# Patient Record
Sex: MALE | Race: WHITE | NOT HISPANIC OR LATINO | Employment: OTHER | URBAN - NONMETROPOLITAN AREA
[De-identification: names, ages, dates, MRNs, and addresses within clinical notes are randomized per-mention and may not be internally consistent; named-entity substitution may affect disease eponyms.]

---

## 2017-05-31 ENCOUNTER — OFFICE VISIT (OUTPATIENT)
Dept: OTOLARYNGOLOGY | Facility: CLINIC | Age: 52
End: 2017-05-31

## 2017-05-31 VITALS — BODY MASS INDEX: 26.06 KG/M2 | TEMPERATURE: 97.9 F | HEIGHT: 67 IN | WEIGHT: 166 LBS

## 2017-05-31 DIAGNOSIS — H92.01 OTALGIA OF RIGHT EAR: Primary | ICD-10-CM

## 2017-05-31 PROCEDURE — 99214 OFFICE O/P EST MOD 30 MIN: CPT | Performed by: OTOLARYNGOLOGY

## 2017-05-31 RX ORDER — VORTIOXETINE 10 MG/1
TABLET, FILM COATED ORAL
Refills: 5 | COMMUNITY
Start: 2017-04-11 | End: 2019-05-16

## 2017-05-31 RX ORDER — PANTOPRAZOLE SODIUM 40 MG/1
40 TABLET, DELAYED RELEASE ORAL DAILY
COMMUNITY
End: 2022-01-05

## 2017-05-31 RX ORDER — MIRTAZAPINE 45 MG/1
45 TABLET, FILM COATED ORAL
COMMUNITY
End: 2019-05-16

## 2017-05-31 RX ORDER — OXYCODONE AND ACETAMINOPHEN 10; 325 MG/1; MG/1
1 TABLET ORAL EVERY 6 HOURS
COMMUNITY

## 2017-05-31 RX ORDER — FLUTICASONE PROPIONATE 50 MCG
2 SPRAY, SUSPENSION (ML) NASAL DAILY
COMMUNITY

## 2019-01-16 ENCOUNTER — TRANSCRIBE ORDERS (OUTPATIENT)
Dept: PODIATRY | Facility: CLINIC | Age: 54
End: 2019-01-16

## 2019-01-16 DIAGNOSIS — M79.671 HEEL PAIN, BILATERAL: Primary | ICD-10-CM

## 2019-01-16 DIAGNOSIS — M79.672 HEEL PAIN, BILATERAL: Primary | ICD-10-CM

## 2019-02-14 ENCOUNTER — OFFICE VISIT (OUTPATIENT)
Dept: PODIATRY | Facility: CLINIC | Age: 54
End: 2019-02-14

## 2019-02-14 VITALS
WEIGHT: 166 LBS | HEIGHT: 67 IN | DIASTOLIC BLOOD PRESSURE: 75 MMHG | BODY MASS INDEX: 26.06 KG/M2 | OXYGEN SATURATION: 98 % | HEART RATE: 75 BPM | SYSTOLIC BLOOD PRESSURE: 129 MMHG

## 2019-02-14 DIAGNOSIS — G89.29 CHRONIC HEEL PAIN, UNSPECIFIED LATERALITY: Primary | ICD-10-CM

## 2019-02-14 DIAGNOSIS — M47.816 LUMBAR SPONDYLOSIS: ICD-10-CM

## 2019-02-14 DIAGNOSIS — R52 PAIN: ICD-10-CM

## 2019-02-14 DIAGNOSIS — M72.2 PLANTAR FASCIITIS: ICD-10-CM

## 2019-02-14 DIAGNOSIS — M79.673 CHRONIC HEEL PAIN, UNSPECIFIED LATERALITY: Primary | ICD-10-CM

## 2019-02-14 PROCEDURE — 99203 OFFICE O/P NEW LOW 30 MIN: CPT | Performed by: PODIATRIST

## 2019-02-14 NOTE — PROGRESS NOTES
Mario Crump  1965  53 y.o. male     Patient presents to clinic today with complaint of bilateral foot pain.    02/14/2019  Chief Complaint   Patient presents with   • Left Foot - Pain   • Right Foot - Pain, Plantar Warts           History of Present Illness    Mario Crump is a 53 y.o. male who presents for evaluation of bilateral foot pain.  He states the pain is primarily located in the bottoms of his heels.  He states is a chronic issue which is been bothering him for greater than 10 years.  He denies any history of trauma or injuries.  He is change boots but otherwise denies any formal treatments for this issue.  He does have a history of chronic lower back issues and has undergone multiple spinal fusion procedures.  He describes the pain as achy and at times throbbing.  He states the pain is worse while standing in place.      Past Medical History:   Diagnosis Date   • Callus    • Ingrown toenail    • Verruca          Past Surgical History:   Procedure Laterality Date   • BACK SURGERY     • NECK SURGERY     • SHOULDER SURGERY Bilateral    • WRIST SURGERY Bilateral          Family History   Problem Relation Age of Onset   • Cancer Mother    • Osteoporosis Father    • Heart disease Father    • Diabetes Father    • Heart disease Brother          Social History     Socioeconomic History   • Marital status: Single     Spouse name: Not on file   • Number of children: Not on file   • Years of education: Not on file   • Highest education level: Not on file   Social Needs   • Financial resource strain: Not on file   • Food insecurity - worry: Not on file   • Food insecurity - inability: Not on file   • Transportation needs - medical: Not on file   • Transportation needs - non-medical: Not on file   Occupational History   • Not on file   Tobacco Use   • Smoking status: Current Every Day Smoker     Packs/day: 1.00     Types: Cigarettes   Substance and Sexual Activity   • Alcohol use: Not on file   • Drug  "use: Not on file   • Sexual activity: Not on file   Other Topics Concern   • Not on file   Social History Narrative   • Not on file         Current Outpatient Medications   Medication Sig Dispense Refill   • mirtazapine (REMERON) 45 MG tablet Take 45 mg by mouth.     • oxyCODONE-acetaminophen (PERCOCET)  MG per tablet Take 1 tablet by mouth Every 6 (Six) Hours.     • pantoprazole (PROTONIX) 40 MG EC tablet Take 40 mg by mouth.     • TRINTELLIX 10 MG tablet TAKE 1 TABLET (10 MG) BY ORAL ROUTE ONCE DAILY AT THE SAME TIME EACH DAY  5   • fluticasone (FLONASE) 50 MCG/ACT nasal spray 2 sprays into each nostril.       No current facility-administered medications for this visit.          OBJECTIVE    /75   Pulse 75   Ht 170.2 cm (67\")   Wt 75.3 kg (166 lb)   SpO2 98%   BMI 26.00 kg/m²       Review of Systems   Constitutional: Positive for fatigue.   HENT: Positive for hearing loss.    Eyes: Positive for visual disturbance.   Respiratory: Positive for cough.    Cardiovascular: Positive for leg swelling.   Gastrointestinal: Positive for abdominal pain.   Endocrine: Negative.    Genitourinary: Negative.    Musculoskeletal: Positive for arthralgias, back pain and joint swelling.        Foot pain, Joint pain   Skin: Negative.    Neurological: Positive for numbness and headaches.   Psychiatric/Behavioral:        Depression         Physical Exam   Constitutional: he appears well-developed and well-nourished.   HEENT: Normocephalic. Atraumatic.  CV: No CP. RRR  Resp: Non-labored respirations.  Psychiatric: he has a normal mood and affect. his behavior is normal.         Lower Extremity Exam:  Vascular: DP/PT pulses palpable 2+.   No edema  Foot warm  Neuro: Protective sensation intact, b/l.  DTRs intact  Negative Tinel over tarsal tunnel  Integument: No open wounds  Intractable plantar keratosis sub-fifth metatarsal head on the righ,t positive tenderness to palpation.  No erythema, scaling  No " masses  Musculoskeletal: LE muscle strength 5/5.   Gait normal  Ankle ROM wnl  STJ ROM full without pain or crepitus  Pain on palpation of plantar calcaneal tubercles, bilateral  Mild tenderness to lateral compression of calcaneus, bilateral              ASSESSMENT AND PLAN    Mario was seen today for pain, pain and plantar warts.    Diagnoses and all orders for this visit:    Chronic heel pain, unspecified laterality    Pain  -     XR Foot 3+ View Right  -     XR Foot 3+ View Left    Plantar fasciitis    Lumbar spondylosis      -Comprehensive foot and ankle exam performed  -Radiographs ordered and reviewed  -Educated pt on diagnosis, etiology and treatment of a typical chronic heel pain.  Discussed likely correlation with chronic low back issues as well as lack of support in his cowboy boots which she is worn every day since he was a kid  -Will refer to physical therapy for custom molded orthotics  -Hold NSAIDs at this time as patient states he was previously taking these for his chronic back issues which did not help with his feet.  -Recheck 4 weeks          This document has been electronically signed by Chacho Allison DPM on February 15, 2019 7:41 AM     EMR Dragon/Transcription disclaimer:   Much of this encounter note is an electronic transcription/translation of spoken language to printed text. The electronic translation of spoken language may permit erroneous, or at times, nonsensical words or phrases to be inadvertently transcribed; Although I have reviewed the note for such errors, some may still exist.    Chacho Allison DPM  2/15/2019  7:41 AM

## 2019-02-19 ENCOUNTER — TRANSCRIBE ORDERS (OUTPATIENT)
Dept: PODIATRY | Facility: CLINIC | Age: 54
End: 2019-02-19

## 2019-02-19 DIAGNOSIS — M72.2 PLANTAR FASCIITIS: Primary | ICD-10-CM

## 2019-02-25 ENCOUNTER — HOSPITAL ENCOUNTER (OUTPATIENT)
Dept: PHYSICAL THERAPY | Facility: HOSPITAL | Age: 54
Setting detail: THERAPIES SERIES
Discharge: HOME | End: 2019-02-25

## 2019-02-25 DIAGNOSIS — M72.2 PLANTAR FASCIITIS: Primary | ICD-10-CM

## 2019-02-25 PROCEDURE — 97161 PT EVAL LOW COMPLEX 20 MIN: CPT | Performed by: PHYSICAL THERAPIST

## 2019-02-25 NOTE — THERAPY EVALUATION
"    Outpatient Physical Therapy Ortho Initial Evaluation  [facility]     Patient Name: Mario Crump  : 1965  MRN: [id number]  Today's Date: 2019      Visit Date: 2019    There is no problem list on file for this patient.       Past Medical History:   Diagnosis Date   • Callus    • Ingrown toenail    • Verruca         Past Surgical History:   Procedure Laterality Date   • BACK SURGERY     • NECK SURGERY     • SHOULDER SURGERY Bilateral    • WRIST SURGERY Bilateral        Visit Dx:     ICD-10-CM ICD-9-CM   1. Plantar fasciitis M72.2 728.71       Patient History     Row Name 19 0910             History    Chief Complaint  Pain;Difficulty Walking  -BB      Type of Pain  Back pain  -BB      Date Current Problem(s) Began  -- chronic   -BB      Brief Description of Current Complaint  Present today with chronic bilateral foot pain. Has history of neck and low back fusions and surgeries. Notes a spot on right foot on left 4th/5th met head that is sensitive. Notes wearing slip on boots all of the time. No history of custom orthotics in the past.   -BB      Patient/Caregiver Goals  Relieve pain  -BB      Patient's Rating of General Health  Good  -BB      Hand Dominance  right-handed  -BB      Occupation/sports/leisure activities  N/A  -BB         Pain     Pain Location  Foot  -BB      Pain at Present  4  -BB      Pain at Best  2  -BB      Pain at Worst  -- \"its crazy\" \"makes ya sick it hurts so bad\"  -BB      Pain Frequency  Constant/continuous  -BB      What Performance Factors Make the Current Problem(s) WORSE?  standing, WB  -BB      Pain Comments  \"Feels like someones beating them with a hammer\"  -BB      Is your sleep disturbed?  Yes  -BB         Fall Risk Assessment    Any falls in the past year:  No  -BB        User Key  (r) = Recorded By, (t) = Taken By, (c) = Cosigned By    Initials Name Provider Type    Kelly Ortiz PT Physical Therapist          PT Ortho     Row Name 19 " 0910       Subjective Comments    Subjective Comments  See patient history   -BB       Precautions and Contraindications    Precautions  spinal surgeries neck and back   -BB       Subjective Pain    Able to rate subjective pain?  yes  -BB    Pre-Treatment Pain Level  4  -BB    Post-Treatment Pain Level  4  -BB       Posture/Observations    Posture/Observations Comments  Supinated foot in gait and in rest. Slight callusing noted on 4th and 5th met head R>L   -BB       Special Tests/Palpation    Special Tests/Palpation  -- no significant ttp noted.   -BB       General ROM    GENERAL ROM COMMENTS  Ankle and toes WFL for normal gait pattern   -BB       MMT (Manual Muscle Testing)    General MMT Comments  WFL for gait   -BB       Sensation    Sensation WNL?  WNL  -BB    Light Touch  No apparent deficits  -BB       Gait/Stairs Assessment/Training    Comment (Gait/Stairs)  Bilateral supinated foot in gait and stance. Bilateral boots wore down in a supinated position.   -BB      User Key  (r) = Recorded By, (t) = Taken By, (c) = Cosigned By    Initials Name Provider Type    Kelly Ortiz, PT Physical Therapist                      Therapy Education  Education Details: Skin inspection and wear schedule   Given: Other (comment)  Program: New  How Provided: Verbal  Provided to: Patient  Level of Understanding: Verbalized     PT OP Goals     Row Name 02/25/19 0910          PT Short Term Goals    STG Date to Achieve  03/18/19  -BB     STG 1  Independent in wear schedule and skin inspection   -BB        Time Calculation    PT Goal Re-Cert Due Date  03/18/19  -BB       User Key  (r) = Recorded By, (t) = Taken By, (c) = Cosigned By    Initials Name Provider Type    Kelly Ortiz, PT Physical Therapist          PT Assessment/Plan     Row Name 02/25/19 0910          PT Assessment    Functional Limitations  Impaired gait;Limitations in functional capacity and performance  -BB     Impairments  Gait;Poor body mechanics  -BB      Assessment Comments  Patient present with bilateral foot pain that is noted with standing and weight bearing activities. Patient has a supinated foot and pes cavus foot. Patient could benefit from custom orthotics to aide in support of the arch. Orthotics to be fabricated per MD orders for tenderfoot firm.   -BB     Rehab Potential  Good  -BB     Patient/caregiver participated in establishment of treatment plan and goals  Yes  -BB     Patient would benefit from skilled therapy intervention  Yes  -BB        PT Plan    Planned CPT's?  PT EVAL LOW COMPLEXITY: 09190;PT ORTHOTIC MGMT/TRAIN EA 15 MIN: 40515;PT THER SUPP EA 15 MIN  -BB     PT Plan Comments  orthotic adjustment PRN and check out   -BB       User Key  (r) = Recorded By, (t) = Taken By, (c) = Cosigned By    Initials Name Provider Type    Kelly Ortiz PT Physical Therapist            Exercises     Row Name 02/25/19 0910             Subjective Comments    Subjective Comments  See patient history   -BB         Subjective Pain    Able to rate subjective pain?  yes  -BB      Pre-Treatment Pain Level  4  -BB      Post-Treatment Pain Level  4  -BB        User Key  (r) = Recorded By, (t) = Taken By, (c) = Cosigned By    Initials Name Provider Type    Kelly Ortiz PT Physical Therapist                                  Time Calculation:         Start Time: 0910  Stop Time: 0944  Time Calculation (min): 34 min     Therapy Charges for Today     Code Description Service Date Service Provider Modifiers Qty    69054430589 HC PT EVAL LOW COMPLEXITY 1 2/25/2019 Kelly Hernandez PT GP 1    15138354410 HC PT-CUSTOM ORTHOTICS-LEVEL 2 2/25/2019 Kelly Hernandez, PT  1                    Kelly Hernandez PT  2/25/2019

## 2019-03-21 ENCOUNTER — DOCUMENTATION (OUTPATIENT)
Dept: PHYSICAL THERAPY | Facility: HOSPITAL | Age: 54
End: 2019-03-21

## 2019-05-16 RX ORDER — TRAMADOL HYDROCHLORIDE 100 MG/1
1 CAPSULE ORAL NIGHTLY
COMMUNITY
End: 2022-01-05

## 2019-05-23 ENCOUNTER — HOSPITAL ENCOUNTER (OUTPATIENT)
Facility: HOSPITAL | Age: 54
Setting detail: HOSPITAL OUTPATIENT SURGERY
Discharge: HOME | End: 2019-05-23
Attending: INTERNAL MEDICINE | Admitting: INTERNAL MEDICINE

## 2019-05-23 ENCOUNTER — ANESTHESIA (OUTPATIENT)
Dept: GASTROENTEROLOGY | Facility: HOSPITAL | Age: 54
End: 2019-05-23

## 2019-05-23 ENCOUNTER — ANESTHESIA EVENT (OUTPATIENT)
Dept: GASTROENTEROLOGY | Facility: HOSPITAL | Age: 54
End: 2019-05-23

## 2019-05-23 VITALS
HEART RATE: 65 BPM | HEIGHT: 67 IN | DIASTOLIC BLOOD PRESSURE: 67 MMHG | TEMPERATURE: 96.8 F | BODY MASS INDEX: 26.06 KG/M2 | RESPIRATION RATE: 18 BRPM | OXYGEN SATURATION: 98 % | WEIGHT: 166 LBS | SYSTOLIC BLOOD PRESSURE: 102 MMHG

## 2019-05-23 DIAGNOSIS — K30 STOMACH UPSET: ICD-10-CM

## 2019-05-23 PROCEDURE — 88305 TISSUE EXAM BY PATHOLOGIST: CPT | Performed by: INTERNAL MEDICINE

## 2019-05-23 PROCEDURE — 87071 CULTURE AEROBIC QUANT OTHER: CPT | Performed by: INTERNAL MEDICINE

## 2019-05-23 PROCEDURE — 25010000002 PROPOFOL 10 MG/ML EMULSION: Performed by: NURSE ANESTHETIST, CERTIFIED REGISTERED

## 2019-05-23 PROCEDURE — 88305 TISSUE EXAM BY PATHOLOGIST: CPT | Performed by: PATHOLOGY

## 2019-05-23 RX ORDER — LIDOCAINE HYDROCHLORIDE 20 MG/ML
INJECTION, SOLUTION INFILTRATION; PERINEURAL AS NEEDED
Status: DISCONTINUED | OUTPATIENT
Start: 2019-05-23 | End: 2019-05-23 | Stop reason: SURG

## 2019-05-23 RX ORDER — PROPOFOL 10 MG/ML
VIAL (ML) INTRAVENOUS AS NEEDED
Status: DISCONTINUED | OUTPATIENT
Start: 2019-05-23 | End: 2019-05-23 | Stop reason: SURG

## 2019-05-23 RX ORDER — DEXTROSE AND SODIUM CHLORIDE 5; .45 G/100ML; G/100ML
20 INJECTION, SOLUTION INTRAVENOUS CONTINUOUS
Status: DISCONTINUED | OUTPATIENT
Start: 2019-05-23 | End: 2019-05-23 | Stop reason: HOSPADM

## 2019-05-23 RX ADMIN — LIDOCAINE HYDROCHLORIDE 100 MG: 20 INJECTION, SOLUTION INFILTRATION; PERINEURAL at 09:13

## 2019-05-23 RX ADMIN — DEXTROSE AND SODIUM CHLORIDE 20 ML/HR: 5; 450 INJECTION, SOLUTION INTRAVENOUS at 08:28

## 2019-05-23 RX ADMIN — PROPOFOL 120 MG: 10 INJECTION, EMULSION INTRAVENOUS at 09:13

## 2019-05-23 NOTE — ANESTHESIA POSTPROCEDURE EVALUATION
Patient: Mario Crump    Procedure Summary     Date:  05/23/19 Room / Location:  [facility] ENDOSCOPY 2 / [facility] ENDOSCOPY    Anesthesia Start:  0913 Anesthesia Stop:  0920    Procedure:  ESOPHAGOGASTRODUODENOSCOPY (N/A ) Diagnosis:       Stomach upset      (Stomach upset [K30])    Surgeon:  Chacho Kennedy DO Provider:  Porter Xiong CRNA    Anesthesia Type:  MAC ASA Status:  2          Anesthesia Type: MAC  Last vitals  BP   109/70 (05/23/19 0823)   Temp   97.9 °F (36.6 °C) (05/23/19 0823)   Pulse   67 (05/23/19 0823)   Resp   18 (05/23/19 0823)     SpO2   99 % (05/23/19 0823)     Post Anesthesia Care and Evaluation    Patient location during evaluation: bedside  Patient participation: waiting for patient participation  Level of consciousness: responsive to verbal stimuli  Pain management: adequate  Airway patency: patent  Anesthetic complications: No anesthetic complications  PONV Status: none  Cardiovascular status: acceptable  Respiratory status: acceptable  Hydration status: acceptable

## 2019-05-23 NOTE — ANESTHESIA PREPROCEDURE EVALUATION
Anesthesia Evaluation     NPO Solid Status: > 8 hours  NPO Liquid Status: > 8 hours           Airway   Mallampati: II  TM distance: >3 FB  Neck ROM: full  no difficulty expected  Dental - normal exam     Pulmonary - normal exam   Cardiovascular - normal exam        Neuro/Psych  (+) psychiatric history,     GI/Hepatic/Renal/Endo    (+)  GERD,      Musculoskeletal     Abdominal    Substance History      OB/GYN          Other                        Anesthesia Plan    ASA 2     MAC     intravenous induction   Anesthetic plan, all risks, benefits, and alternatives have been provided, discussed and informed consent has been obtained with: patient.

## 2019-05-24 LAB
LAB AP CASE REPORT: NORMAL
PATH REPORT.FINAL DX SPEC: NORMAL
PATH REPORT.GROSS SPEC: NORMAL

## 2019-05-25 LAB
BACTERIA SPEC AEROBE CULT: ABNORMAL
BACTERIA SPEC AEROBE CULT: ABNORMAL

## 2019-07-15 ENCOUNTER — HOSPITAL ENCOUNTER (OUTPATIENT)
Dept: CT IMAGING | Facility: HOSPITAL | Age: 54
Discharge: HOME | End: 2019-07-15
Admitting: INTERNAL MEDICINE

## 2019-07-15 DIAGNOSIS — R10.84 ABDOMINAL PAIN, GENERALIZED: ICD-10-CM

## 2019-07-15 PROCEDURE — 0 DIATRIZOATE MEGLUMINE & SODIUM PER 1 ML: Performed by: INTERNAL MEDICINE

## 2019-07-15 PROCEDURE — 25010000002 IOPAMIDOL 61 % SOLUTION: Performed by: INTERNAL MEDICINE

## 2019-07-15 PROCEDURE — 74177 CT ABD & PELVIS W/CONTRAST: CPT

## 2019-07-15 RX ADMIN — IOPAMIDOL 90 ML: 612 INJECTION, SOLUTION INTRAVENOUS at 16:52

## 2019-07-15 RX ADMIN — DIATRIZOATE MEGLUMINE AND DIATRIZOATE SODIUM 45 ML: 660; 100 LIQUID ORAL; RECTAL at 16:52

## 2019-07-18 ENCOUNTER — HOSPITAL ENCOUNTER (OUTPATIENT)
Dept: NUCLEAR MEDICINE | Facility: HOSPITAL | Age: 54
Discharge: HOME | End: 2019-07-18

## 2019-07-18 DIAGNOSIS — R10.84 ABDOMINAL PAIN, GENERALIZED: ICD-10-CM

## 2019-07-18 PROCEDURE — 78264 GASTRIC EMPTYING IMG STUDY: CPT

## 2019-07-18 PROCEDURE — A9541 TC99M SULFUR COLLOID: HCPCS | Performed by: INTERNAL MEDICINE

## 2019-07-18 PROCEDURE — 0 TECHNETIUM SULFUR COLLOID: Performed by: INTERNAL MEDICINE

## 2019-07-18 RX ADMIN — TECHNETIUM TC 99M SULFUR COLLOID 1 DOSE: KIT at 09:15

## 2019-07-19 ENCOUNTER — HOSPITAL ENCOUNTER (OUTPATIENT)
Dept: MRI IMAGING | Facility: HOSPITAL | Age: 54
Discharge: HOME | End: 2019-07-19

## 2019-07-19 ENCOUNTER — HOSPITAL ENCOUNTER (OUTPATIENT)
Dept: MRI IMAGING | Facility: HOSPITAL | Age: 54
Discharge: HOME | End: 2019-07-19
Admitting: INTERNAL MEDICINE

## 2019-07-19 DIAGNOSIS — R93.89 ABNORMAL RADIOLOGICAL FINDINGS IN SKIN AND SUBCUTANEOUS TISSUE: ICD-10-CM

## 2019-07-19 PROCEDURE — 73721 MRI JNT OF LWR EXTRE W/O DYE: CPT

## 2022-01-05 ENCOUNTER — OFFICE VISIT (OUTPATIENT)
Dept: GASTROENTEROLOGY | Facility: CLINIC | Age: 57
End: 2022-01-05

## 2022-01-05 VITALS
DIASTOLIC BLOOD PRESSURE: 78 MMHG | WEIGHT: 157.8 LBS | BODY MASS INDEX: 24.77 KG/M2 | HEART RATE: 77 BPM | HEIGHT: 67 IN | SYSTOLIC BLOOD PRESSURE: 134 MMHG

## 2022-01-05 DIAGNOSIS — R10.10 PAIN OF UPPER ABDOMEN: Primary | ICD-10-CM

## 2022-01-05 DIAGNOSIS — Z12.11 ENCOUNTER FOR SCREENING FOR MALIGNANT NEOPLASM OF COLON: ICD-10-CM

## 2022-01-05 DIAGNOSIS — R14.0 BLOATING: ICD-10-CM

## 2022-01-05 PROCEDURE — 99204 OFFICE O/P NEW MOD 45 MIN: CPT | Performed by: INTERNAL MEDICINE

## 2022-01-05 RX ORDER — TRAZODONE HYDROCHLORIDE 100 MG/1
200 TABLET ORAL DAILY
COMMUNITY
Start: 2021-07-15 | End: 2022-01-20 | Stop reason: SDUPTHER

## 2022-01-05 RX ORDER — DEXTROSE AND SODIUM CHLORIDE 5; .45 G/100ML; G/100ML
30 INJECTION, SOLUTION INTRAVENOUS CONTINUOUS PRN
Status: CANCELLED | OUTPATIENT
Start: 2022-01-13

## 2022-01-05 RX ORDER — OMEPRAZOLE 40 MG/1
40 CAPSULE, DELAYED RELEASE ORAL DAILY
COMMUNITY
Start: 2021-11-01 | End: 2022-05-01

## 2022-01-05 RX ORDER — SUCRALFATE 1 G/1
1 TABLET ORAL 4 TIMES DAILY
Qty: 120 TABLET | Refills: 5 | Status: SHIPPED | OUTPATIENT
Start: 2022-01-05 | End: 2022-02-04

## 2022-01-05 RX ORDER — ALBUTEROL SULFATE 90 UG/1
2 AEROSOL, METERED RESPIRATORY (INHALATION) EVERY 6 HOURS PRN
COMMUNITY
Start: 2021-11-23 | End: 2022-02-22

## 2022-01-05 NOTE — PATIENT INSTRUCTIONS
"BMI for Adults  What is BMI?  Body mass index (BMI) is a number that is calculated from a person's weight and height. BMI can help estimate how much of a person's weight is composed of fat. BMI does not measure body fat directly. Rather, it is an alternative to procedures that directly measure body fat, which can be difficult and expensive.  BMI can help identify people who may be at higher risk for certain medical problems.  What are BMI measurements used for?  BMI is used as a screening tool to identify possible weight problems. It helps determine whether a person is obese, overweight, a healthy weight, or underweight.  BMI is useful for:  · Identifying a weight problem that may be related to a medical condition or may increase the risk for medical problems.  · Promoting changes, such as changes in diet and exercise, to help reach a healthy weight. BMI screening can be repeated to see if these changes are working.  How is BMI calculated?  BMI involves measuring your weight in relation to your height. Both height and weight are measured, and the BMI is calculated from those numbers. This can be done either in English ([place] or metric measurements. Note that charts and online BMI calculators are available to help you find your BMI quickly and easily without having to do these calculations yourself.  To calculate your BMI in English ([place] measurements:    1. Measure your weight in pounds (lb).  2. Multiply the number of pounds by 703.  ? For example, for a person who weighs 180 lb, multiply that number by 703, which equals 126,540.  3. Measure your height in inches. Then multiply that number by itself to get a measurement called \"inches squared.\"  ? For example, for a person who is 70 inches tall, the \"inches squared\" measurement is 70 inches x 70 inches, which equals 4,900 inches squared.  4. Divide the total from step 2 (number of lb x 703) by the total from step 3 (inches squared): 126,540 ÷ 4,900 = 25.8. This is " "your BMI.    To calculate your BMI in metric measurements:  1. Measure your weight in kilograms (kg).  2. Measure your height in meters (m). Then multiply that number by itself to get a measurement called \"meters squared.\"  ? For example, for a person who is 1.75 m tall, the \"meters squared\" measurement is 1.75 m x 1.75 m, which is equal to 3.1 meters squared.  3. Divide the number of kilograms (your weight) by the meters squared number. In this example: 70 ÷ 3.1 = 22.6. This is your BMI.  What do the results mean?  BMI charts are used to identify whether you are underweight, normal weight, overweight, or obese. The following guidelines will be used:  · Underweight: BMI less than 18.5.  · Normal weight: BMI between 18.5 and 24.9.  · Overweight: BMI between 25 and 29.9.  · Obese: BMI of 30 or above.  Keep these notes in mind:  · Weight includes both fat and muscle, so someone with a muscular build, such as an athlete, may have a BMI that is higher than 24.9. In cases like these, BMI is not an accurate measure of body fat.  · To determine if excess body fat is the cause of a BMI of 25 or higher, further assessments may need to be done by a health care provider.  · BMI is usually interpreted in the same way for men and women.  Where to find more information  For more information about BMI, including tools to quickly calculate your BMI, go to these websites:  · Centers for Disease Control and Prevention: www.cdc.gov  · American Heart Association: www.heart.org  · National Heart, Lung, and Blood [place]: www.nhlbi.nih.gov  Summary  · Body mass index (BMI) is a number that is calculated from a person's weight and height.  · BMI may help estimate how much of a person's weight is composed of fat. BMI can help identify those who may be at higher risk for certain medical problems.  · BMI can be measured using English measurements or metric measurements.  · BMI charts are used to identify whether you are underweight, normal " weight, overweight, or obese.  This information is not intended to replace advice given to you by your health care provider. Make sure you discuss any questions you have with your health care provider.  Document Revised: 09/09/2020 Document Reviewed: 07/17/2020  Elsevier Patient Education © 2021 [employer]

## 2022-01-11 ENCOUNTER — LAB (OUTPATIENT)
Dept: LAB | Facility: HOSPITAL | Age: 57
End: 2022-01-11

## 2022-01-11 DIAGNOSIS — Z12.11 ENCOUNTER FOR SCREENING FOR MALIGNANT NEOPLASM OF COLON: ICD-10-CM

## 2022-01-11 DIAGNOSIS — R14.0 BLOATING: ICD-10-CM

## 2022-01-11 DIAGNOSIS — R10.10 PAIN OF UPPER ABDOMEN: ICD-10-CM

## 2022-01-11 LAB — SARS-COV-2 N GENE RESP QL NAA+PROBE: NOT DETECTED

## 2022-01-11 PROCEDURE — C9803 HOPD COVID-19 SPEC COLLECT: HCPCS

## 2022-01-11 PROCEDURE — 87635 SARS-COV-2 COVID-19 AMP PRB: CPT

## 2022-01-13 ENCOUNTER — ANESTHESIA (OUTPATIENT)
Dept: GASTROENTEROLOGY | Facility: HOSPITAL | Age: 57
End: 2022-01-13

## 2022-01-13 ENCOUNTER — HOSPITAL ENCOUNTER (OUTPATIENT)
Facility: HOSPITAL | Age: 57
Setting detail: HOSPITAL OUTPATIENT SURGERY
Discharge: HOME | End: 2022-01-13
Attending: INTERNAL MEDICINE | Admitting: INTERNAL MEDICINE

## 2022-01-13 ENCOUNTER — ANESTHESIA EVENT (OUTPATIENT)
Dept: GASTROENTEROLOGY | Facility: HOSPITAL | Age: 57
End: 2022-01-13

## 2022-01-13 VITALS
BODY MASS INDEX: 24.48 KG/M2 | RESPIRATION RATE: 18 BRPM | DIASTOLIC BLOOD PRESSURE: 68 MMHG | HEIGHT: 67 IN | WEIGHT: 156 LBS | HEART RATE: 79 BPM | TEMPERATURE: 97.1 F | SYSTOLIC BLOOD PRESSURE: 108 MMHG | OXYGEN SATURATION: 99 %

## 2022-01-13 DIAGNOSIS — Z12.11 ENCOUNTER FOR SCREENING FOR MALIGNANT NEOPLASM OF COLON: ICD-10-CM

## 2022-01-13 DIAGNOSIS — R10.10 PAIN OF UPPER ABDOMEN: ICD-10-CM

## 2022-01-13 DIAGNOSIS — R14.0 BLOATING: ICD-10-CM

## 2022-01-13 PROCEDURE — 88305 TISSUE EXAM BY PATHOLOGIST: CPT

## 2022-01-13 PROCEDURE — 43239 EGD BIOPSY SINGLE/MULTIPLE: CPT | Performed by: INTERNAL MEDICINE

## 2022-01-13 PROCEDURE — 45385 COLONOSCOPY W/LESION REMOVAL: CPT | Performed by: INTERNAL MEDICINE

## 2022-01-13 PROCEDURE — 25010000002 PROPOFOL 10 MG/ML EMULSION: Performed by: NURSE ANESTHETIST, CERTIFIED REGISTERED

## 2022-01-13 PROCEDURE — 25010000002 FENTANYL CITRATE (PF) 50 MCG/ML SOLUTION: Performed by: NURSE ANESTHETIST, CERTIFIED REGISTERED

## 2022-01-13 PROCEDURE — 25010000002 MIDAZOLAM PER 1 MG: Performed by: NURSE ANESTHETIST, CERTIFIED REGISTERED

## 2022-01-13 RX ORDER — DEXTROSE AND SODIUM CHLORIDE 5; .45 G/100ML; G/100ML
30 INJECTION, SOLUTION INTRAVENOUS CONTINUOUS PRN
Status: DISCONTINUED | OUTPATIENT
Start: 2022-01-13 | End: 2022-01-13 | Stop reason: HOSPADM

## 2022-01-13 RX ORDER — MIDAZOLAM HYDROCHLORIDE 1 MG/ML
INJECTION INTRAMUSCULAR; INTRAVENOUS AS NEEDED
Status: DISCONTINUED | OUTPATIENT
Start: 2022-01-13 | End: 2022-01-13 | Stop reason: SURG

## 2022-01-13 RX ORDER — PROPOFOL 10 MG/ML
VIAL (ML) INTRAVENOUS AS NEEDED
Status: DISCONTINUED | OUTPATIENT
Start: 2022-01-13 | End: 2022-01-13 | Stop reason: SURG

## 2022-01-13 RX ORDER — FENTANYL CITRATE 50 UG/ML
INJECTION, SOLUTION INTRAMUSCULAR; INTRAVENOUS AS NEEDED
Status: DISCONTINUED | OUTPATIENT
Start: 2022-01-13 | End: 2022-01-13 | Stop reason: SURG

## 2022-01-13 RX ORDER — LIDOCAINE HYDROCHLORIDE 20 MG/ML
INJECTION, SOLUTION INTRAVENOUS AS NEEDED
Status: DISCONTINUED | OUTPATIENT
Start: 2022-01-13 | End: 2022-01-13 | Stop reason: SURG

## 2022-01-13 RX ADMIN — MIDAZOLAM HYDROCHLORIDE 2 MG: 1 INJECTION, SOLUTION INTRAMUSCULAR; INTRAVENOUS at 10:36

## 2022-01-13 RX ADMIN — DEXTROSE AND SODIUM CHLORIDE 30 ML/HR: 5; 450 INJECTION, SOLUTION INTRAVENOUS at 10:11

## 2022-01-13 RX ADMIN — PROPOFOL 50 MG: 10 INJECTION, EMULSION INTRAVENOUS at 10:51

## 2022-01-13 RX ADMIN — LIDOCAINE HYDROCHLORIDE 50 MG: 20 INJECTION, SOLUTION INTRAVENOUS at 10:36

## 2022-01-13 RX ADMIN — PROPOFOL 50 MG: 10 INJECTION, EMULSION INTRAVENOUS at 10:43

## 2022-01-13 RX ADMIN — FENTANYL CITRATE 100 MCG: 50 INJECTION INTRAMUSCULAR; INTRAVENOUS at 10:36

## 2022-01-13 NOTE — ANESTHESIA POSTPROCEDURE EVALUATION
Patient: Mario Crump    Procedure Summary     Date: 01/13/22 Room / Location: [facility] ENDOSCOPY 3 / [facility] ENDOSCOPY    Anesthesia Start: 1031 Anesthesia Stop: 1057    Procedures:       ESOPHAGOGASTRODUODENOSCOPY (N/A )      COLONOSCOPY (N/A ) Diagnosis:       Pain of upper abdomen      Bloating      Encounter for screening for malignant neoplasm of colon      (Pain of upper abdomen [R10.10])      (Bloating [R14.0])      (Encounter for screening for malignant neoplasm of colon [Z12.11])    Surgeons: Maryanne Maxwell MD Provider: Annie Wan CRNA    Anesthesia Type: MAC ASA Status: 2          Anesthesia Type: MAC    Vitals  No vitals data found for the desired time range.          Post Anesthesia Care and Evaluation    Patient location during evaluation: PACU  Patient participation: complete - patient participated  Level of consciousness: awake and alert  Pain score: 0  Pain management: adequate  Airway patency: patent  Anesthetic complications: No anesthetic complications  PONV Status: none  Cardiovascular status: acceptable  Respiratory status: acceptable  Hydration status: acceptable

## 2022-01-13 NOTE — PROGRESS NOTES
[facility]      Chief Complaint:   Chief Complaint   Patient presents with   • Abdominal Pain     cramping        Subjective     HPI:   Mr. Crump is a 56-year-old  male with past medical history of anxiety, COPD, ingestional, GERD Uecker, callus, GERD presenting for evaluation for abdominal pain.  He has intermittent bouts of epigastric cramping abdominal discomfort for past few months associated with bloating.  Pain is intermittent, moderate, nonradiating and is unchanged with food intake or defecation.  He denied nausea, vomiting, diarrhea, constipation, rectal bleeding or weight loss.  Taking Prilosec daily without much help.  Denied NSAID usage.  Has GERD for past several years with symptoms well controlled with PPI.  He is due for colorectal cancer screening.    Past Medical History:   Past Medical History:   Diagnosis Date   • Anxiety    • Callus    • COPD (chronic obstructive pulmonary disease) (HCC)    • GERD (gastroesophageal reflux disease)    • Ingrown toenail    • Verruca        Past Surgical History:  Past Surgical History:   Procedure Laterality Date   • BACK SURGERY     • ENDOSCOPY N/A 5/23/2019    Procedure: ESOPHAGOGASTRODUODENOSCOPY;  Surgeon: Chacho Kennedy DO;  Location: [facility] ENDOSCOPY;  Service: Gastroenterology   • NECK SURGERY      x 3   • SHOULDER SURGERY Bilateral    • WRIST SURGERY Bilateral        Family History:  Family History   Problem Relation Age of Onset   • Cancer Mother    • Osteoporosis Father    • Heart disease Father    • Diabetes Father    • Heart disease Brother        Social History:   reports that he has been smoking cigarettes. He has a 30.00 pack-year smoking history. He has never used smokeless tobacco. He reports that he does not drink alcohol and does not use drugs.    Medications:   Prior to Admission medications    Medication Sig Start Date End Date Taking? Authorizing Provider   albuterol sulfate  (90 Base) MCG/ACT  inhaler Inhale 2 puffs Every 6 (Six) Hours As Needed. 11/23/21 2/22/22 Yes Cheyenne Andrade MD   fluticasone (FLONASE) 50 MCG/ACT nasal spray 2 sprays into the nostril(s) as directed by provider Daily.   Yes Cheyenne Andrade MD   omeprazole (priLOSEC) 40 MG capsule Take 40 mg by mouth Daily. 11/1/21 5/1/22 Yes Cheyenne Andrade MD   oxyCODONE-acetaminophen (PERCOCET)  MG per tablet Take 1 tablet by mouth Every 6 (Six) Hours.   Yes Cheyenne Andrade MD   traZODone (DESYREL) 100 MG tablet Take 200 mg by mouth Daily. 7/15/21 1/12/22 Yes Cheyenne Andrade MD   polyethylene glycol (GoLYTELY) 236 g solution Please use the instructions given in office 1/5/22   Maryanne Maxwell MD   sucralfate (Carafate) 1 g tablet Take 1 tablet by mouth 4 (Four) Times a Day for 30 days. 1/5/22 2/4/22  Maryanne Maxwell MD       Allergies:  Latex    ROS:    Review of Systems   Constitutional: Negative for chills, fatigue, fever and unexpected weight change.   HENT: Negative for congestion, ear discharge, hearing loss, nosebleeds and sore throat.    Eyes: Negative for pain, discharge and redness.   Respiratory: Negative for cough, chest tightness, shortness of breath and wheezing.    Cardiovascular: Negative for chest pain and palpitations.   Gastrointestinal: Positive for abdominal pain. Negative for abdominal distention, blood in stool, constipation, diarrhea, nausea and vomiting.   Endocrine: Negative for cold intolerance, polydipsia, polyphagia and polyuria.   Genitourinary: Negative for dysuria, flank pain, frequency, hematuria and urgency.   Musculoskeletal: Negative for arthralgias, back pain, joint swelling and myalgias.   Skin: Negative for color change, pallor and rash.   Neurological: Negative for tremors, seizures, syncope, weakness and headaches.   Hematological: Negative for adenopathy. Does not bruise/bleed easily.   Psychiatric/Behavioral: Negative for behavioral problems, confusion, dysphoric  "mood, hallucinations and suicidal ideas. The patient is not nervous/anxious.      Objective     Blood pressure 134/78, pulse 77, height 170.2 cm (67\"), weight 71.6 kg (157 lb 12.8 oz).    Physical Exam  Constitutional:       Appearance: He is well-developed.   HENT:      Head: Normocephalic and atraumatic.   Eyes:      Conjunctiva/sclera: Conjunctivae normal.      Pupils: Pupils are equal, round, and reactive to light.   Neck:      Thyroid: No thyromegaly.   Cardiovascular:      Rate and Rhythm: Normal rate and regular rhythm.      Heart sounds: Normal heart sounds. No murmur heard.      Pulmonary:      Effort: Pulmonary effort is normal.      Breath sounds: Normal breath sounds. No wheezing.   Abdominal:      General: Bowel sounds are normal. There is no distension.      Palpations: Abdomen is soft. There is no mass.      Tenderness: There is no abdominal tenderness.      Hernia: No hernia is present.   Genitourinary:     Comments: No lesions noted  Musculoskeletal:         General: No tenderness. Normal range of motion.      Cervical back: Normal range of motion and neck supple.   Lymphadenopathy:      Cervical: No cervical adenopathy.   Skin:     General: Skin is warm and dry.      Findings: No rash.   Neurological:      Mental Status: He is alert and oriented to person, place, and time.      Cranial Nerves: No cranial nerve deficit.   Psychiatric:         Thought Content: Thought content normal.        Extremities: No edema, cyanosis or clubbing.    Assessment/Plan    1.  Epigastric pain with bloating rule out peptic ulcer disease, gastritis and pancreaticobiliary pathology.  Continue PPI and add Carafate 1 g p.o. 4 times daily.  Proceed with EGD for further evaluation.  Biliary evaluation if EGD is unremarkable.  2.  Colorectal cancer screening, schedule colonoscopy.  3.  GERD, well controlled with PPI.  Continue PPI and antireflux lifestyle.  4.  Tobacco usage, recommend cessation.  Diagnoses and all orders " for this visit:    1. Pain of upper abdomen (Primary)  -     Case Request; Standing  -     COVID PRE-OP / PRE-PROCEDURE SCREENING ORDER (NO ISOLATION) - Swab, Nasopharynx; Future  -     Case Request    2. Bloating  -     Case Request; Standing  -     COVID PRE-OP / PRE-PROCEDURE SCREENING ORDER (NO ISOLATION) - Swab, Nasopharynx; Future  -     Case Request    3. Encounter for screening for malignant neoplasm of colon  -     Case Request; Standing  -     COVID PRE-OP / PRE-PROCEDURE SCREENING ORDER (NO ISOLATION) - Swab, Nasopharynx; Future  -     Case Request    Other orders  -     Follow Anesthesia Guidelines / Standing Orders; Future  -     Obtain Informed Consent; Future  -     sucralfate (Carafate) 1 g tablet; Take 1 tablet by mouth 4 (Four) Times a Day for 30 days.  Dispense: 120 tablet; Refill: 5        ESOPHAGOGASTRODUODENOSCOPY (N/A), COLONOSCOPY (N/A)     Diagnosis Plan   1. Pain of upper abdomen  Case Request    COVID PRE-OP / PRE-PROCEDURE SCREENING ORDER (NO ISOLATION) - Swab, Nasopharynx    Case Request   2. Bloating  Case Request    COVID PRE-OP / PRE-PROCEDURE SCREENING ORDER (NO ISOLATION) - Swab, Nasopharynx    Case Request   3. Encounter for screening for malignant neoplasm of colon  Case Request    COVID PRE-OP / PRE-PROCEDURE SCREENING ORDER (NO ISOLATION) - Swab, Nasopharynx    Case Request       Anticipated Surgical Procedure:  Orders Placed This Encounter   Procedures   • COVID PRE-OP / PRE-PROCEDURE SCREENING ORDER (NO ISOLATION) - Swab, Nasopharynx     Standing Status:   Future     Number of Occurrences:   1     Standing Expiration Date:   1/5/2023     Order Specific Question:   Release to patient     Answer:   Immediate     Order Specific Question:   Please select your location:     Answer:   [facility]     Order Specific Question:   COVID Screening Order:     Answer:   In-House: PRE-OP: BD MAX, 8-10 HR TAT [[id number]]     Order Specific Question:   Employed in healthcare setting?      Answer:   No     Order Specific Question:   Symptomatic for COVID-19 as defined by CDC?     Answer:   No     Order Specific Question:   Hospitalized for COVID-19?     Answer:   No     Order Specific Question:   Admitted to ICU for COVID-19?     Answer:   No     Order Specific Question:   Resident in a congregate (group) care setting?     Answer:   No   • Follow Anesthesia Guidelines / Standing Orders     Standing Status:   Future   • Obtain Informed Consent     Standing Status:   Future     Order Specific Question:   Informed Consent Given For     Answer:   egd and colonoscopy       The risks, benefits, and alternatives of this procedure have been discussed with the patient or the responsible party- the patient understands and agrees to proceed.            This document has been electronically signed by Maryanne Maxwell MD on January 13, 2022 10:04 CST

## 2022-01-13 NOTE — H&P
Chief Complaint:        Chief Complaint   Patient presents with   • Abdominal Pain       cramping             Subjective         HPI:   Mr. Crump is a 56-year-old  male with past medical history of anxiety, COPD, ingestional, GERD Uecker, callus, GERD presenting for evaluation for abdominal pain.  He has intermittent bouts of epigastric cramping abdominal discomfort for past few months associated with bloating.  Pain is intermittent, moderate, nonradiating and is unchanged with food intake or defecation.  He denied nausea, vomiting, diarrhea, constipation, rectal bleeding or weight loss.  Taking Prilosec daily without much help.  Denied NSAID usage.  Has GERD for past several years with symptoms well controlled with PPI.  He is due for colorectal cancer screening.     Past Medical History:   Medical History        Past Medical History:   Diagnosis Date   • Anxiety     • Callus     • COPD (chronic obstructive pulmonary disease) (HCC)     • GERD (gastroesophageal reflux disease)     • Ingrown toenail     • Verruca              Surgical History         Past Surgical History:  Past Surgical History:   Procedure Laterality Date   • BACK SURGERY       • ENDOSCOPY N/A 5/23/2019     Procedure: ESOPHAGOGASTRODUODENOSCOPY;  Surgeon: Chacho Kennedy DO;  Location: [facility] ENDOSCOPY;  Service: Gastroenterology   • NECK SURGERY         x 3   • SHOULDER SURGERY Bilateral     • WRIST SURGERY Bilateral              Family History:        Family History   Problem Relation Age of Onset   • Cancer Mother     • Osteoporosis Father     • Heart disease Father     • Diabetes Father     • Heart disease Brother           Social History:   reports that he has been smoking cigarettes. He has a 30.00 pack-year smoking history. He has never used smokeless tobacco. He reports that he does not drink alcohol and does not use drugs.     Medications:           Prior to Admission medications    Medication Sig Start Date End Date Taking?  Authorizing Provider   albuterol sulfate  (90 Base) MCG/ACT inhaler Inhale 2 puffs Every 6 (Six) Hours As Needed. 11/23/21 2/22/22 Yes Cheyenne Andrade MD   fluticasone (FLONASE) 50 MCG/ACT nasal spray 2 sprays into the nostril(s) as directed by provider Daily.     Yes Cheyenne Andrade MD   omeprazole (priLOSEC) 40 MG capsule Take 40 mg by mouth Daily. 11/1/21 5/1/22 Yes Cheyenne Andrade MD   oxyCODONE-acetaminophen (PERCOCET)  MG per tablet Take 1 tablet by mouth Every 6 (Six) Hours.     Yes Cheyenne Andrade MD   traZODone (DESYREL) 100 MG tablet Take 200 mg by mouth Daily. 7/15/21 1/12/22 Yes Cheyenne Andrade MD   polyethylene glycol (GoLYTELY) 236 g solution Please use the instructions given in office 1/5/22     Maryanne Maxwell MD   sucralfate (Carafate) 1 g tablet Take 1 tablet by mouth 4 (Four) Times a Day for 30 days. 1/5/22 2/4/22   Maryanne Maxwell MD         Allergies:  Latex     ROS:    Review of Systems   Constitutional: Negative for chills, fatigue, fever and unexpected weight change.   HENT: Negative for congestion, ear discharge, hearing loss, nosebleeds and sore throat.    Eyes: Negative for pain, discharge and redness.   Respiratory: Negative for cough, chest tightness, shortness of breath and wheezing.    Cardiovascular: Negative for chest pain and palpitations.   Gastrointestinal: Positive for abdominal pain. Negative for abdominal distention, blood in stool, constipation, diarrhea, nausea and vomiting.   Endocrine: Negative for cold intolerance, polydipsia, polyphagia and polyuria.   Genitourinary: Negative for dysuria, flank pain, frequency, hematuria and urgency.   Musculoskeletal: Negative for arthralgias, back pain, joint swelling and myalgias.   Skin: Negative for color change, pallor and rash.   Neurological: Negative for tremors, seizures, syncope, weakness and headaches.   Hematological: Negative for adenopathy. Does not bruise/bleed easily.  "  Psychiatric/Behavioral: Negative for behavioral problems, confusion, dysphoric mood, hallucinations and suicidal ideas. The patient is not nervous/anxious.             Objective         Blood pressure 134/78, pulse 77, height 170.2 cm (67\"), weight 71.6 kg (157 lb 12.8 oz).     Physical Exam  Constitutional:       Appearance: He is well-developed.   HENT:      Head: Normocephalic and atraumatic.   Eyes:      Conjunctiva/sclera: Conjunctivae normal.      Pupils: Pupils are equal, round, and reactive to light.   Neck:      Thyroid: No thyromegaly.   Cardiovascular:      Rate and Rhythm: Normal rate and regular rhythm.      Heart sounds: Normal heart sounds. No murmur heard.       Pulmonary:      Effort: Pulmonary effort is normal.      Breath sounds: Normal breath sounds. No wheezing.   Abdominal:      General: Bowel sounds are normal. There is no distension.      Palpations: Abdomen is soft. There is no mass.      Tenderness: There is no abdominal tenderness.      Hernia: No hernia is present.   Genitourinary:     Comments: No lesions noted  Musculoskeletal:         General: No tenderness. Normal range of motion.      Cervical back: Normal range of motion and neck supple.   Lymphadenopathy:      Cervical: No cervical adenopathy.   Skin:     General: Skin is warm and dry.      Findings: No rash.   Neurological:      Mental Status: He is alert and oriented to person, place, and time.      Cranial Nerves: No cranial nerve deficit.   Psychiatric:         Thought Content: Thought content normal.         Extremities: No edema, cyanosis or clubbing.           Assessment/Plan       1.  Epigastric pain with bloating rule out peptic ulcer disease, gastritis and pancreaticobiliary pathology.  Continue PPI and add Carafate 1 g p.o. 4 times daily.  Proceed with EGD for further evaluation.  Biliary evaluation if EGD is unremarkable.  2.  Colorectal cancer screening, schedule colonoscopy.  3.  GERD, well controlled with PPI.  " Continue PPI and antireflux lifestyle.  4.  Tobacco usage, recommend cessation.  Diagnoses and all orders for this visit:     1. Pain of upper abdomen (Primary)  -     Case Request; Standing  -     COVID PRE-OP / PRE-PROCEDURE SCREENING ORDER (NO ISOLATION) - Swab, Nasopharynx; Future  -     Case Request     2. Bloating  -     Case Request; Standing  -     COVID PRE-OP / PRE-PROCEDURE SCREENING ORDER (NO ISOLATION) - Swab, Nasopharynx; Future  -     Case Request     3. Encounter for screening for malignant neoplasm of colon  -     Case Request; Standing  -     COVID PRE-OP / PRE-PROCEDURE SCREENING ORDER (NO ISOLATION) - Swab, Nasopharynx; Future  -     Case Request     Other orders  -     Follow Anesthesia Guidelines / Standing Orders; Future  -     Obtain Informed Consent; Future  -     sucralfate (Carafate) 1 g tablet; Take 1 tablet by mouth 4 (Four) Times a Day for 30 days.  Dispense: 120 tablet; Refill: 5           ESOPHAGOGASTRODUODENOSCOPY (N/A), COLONOSCOPY (N/A)       Diagnosis Plan   1. Pain of upper abdomen  Case Request     COVID PRE-OP / PRE-PROCEDURE SCREENING ORDER (NO ISOLATION) - Swab, Nasopharynx     Case Request   2. Bloating  Case Request     COVID PRE-OP / PRE-PROCEDURE SCREENING ORDER (NO ISOLATION) - Swab, Nasopharynx     Case Request   3. Encounter for screening for malignant neoplasm of colon  Case Request     COVID PRE-OP / PRE-PROCEDURE SCREENING ORDER (NO ISOLATION) - Swab, Nasopharynx     Case Request         Anticipated Surgical Procedure:        Orders Placed This Encounter   Procedures   • COVID PRE-OP / PRE-PROCEDURE SCREENING ORDER (NO ISOLATION) - Swab, Nasopharynx       Standing Status:   Future       Number of Occurrences:   1       Standing Expiration Date:   1/5/2023       Order Specific Question:   Release to patient       Answer:   Immediate       Order Specific Question:   Please select your location:       Answer:   [facility]       Order Specific Question:   COVID  Screening Order:       Answer:   In-House: PRE-OP: BD MAX, 8-10 HR TAT [[id number]]       Order Specific Question:   Employed in healthcare setting?       Answer:   No       Order Specific Question:   Symptomatic for COVID-19 as defined by CDC?       Answer:   No       Order Specific Question:   Hospitalized for COVID-19?       Answer:   No       Order Specific Question:   Admitted to ICU for COVID-19?       Answer:   No       Order Specific Question:   Resident in a congregate (group) care setting?       Answer:   No   • Follow Anesthesia Guidelines / Standing Orders       Standing Status:   Future   • Obtain Informed Consent       Standing Status:   Future       Order Specific Question:   Informed Consent Given For       Answer:   egd and colonoscopy         The risks, benefits, and alternatives of this procedure have been discussed with the patient or the responsible party- the patient understands and agrees to proceed.

## 2022-01-14 LAB
LAB AP CASE REPORT: NORMAL
PATH REPORT.FINAL DX SPEC: NORMAL

## 2022-01-20 ENCOUNTER — OFFICE VISIT (OUTPATIENT)
Dept: GASTROENTEROLOGY | Facility: CLINIC | Age: 57
End: 2022-01-20

## 2022-01-20 VITALS
WEIGHT: 159.2 LBS | HEART RATE: 71 BPM | DIASTOLIC BLOOD PRESSURE: 75 MMHG | BODY MASS INDEX: 24.99 KG/M2 | SYSTOLIC BLOOD PRESSURE: 151 MMHG | HEIGHT: 67 IN

## 2022-01-20 DIAGNOSIS — R10.10 PAIN OF UPPER ABDOMEN: Primary | ICD-10-CM

## 2022-01-20 PROCEDURE — 99214 OFFICE O/P EST MOD 30 MIN: CPT | Performed by: INTERNAL MEDICINE

## 2022-01-20 RX ORDER — TRAZODONE HYDROCHLORIDE 100 MG/1
2 TABLET ORAL
COMMUNITY
Start: 2022-01-13 | End: 2022-04-14

## 2022-01-20 RX ORDER — DICYCLOMINE HCL 20 MG
20 TABLET ORAL EVERY 6 HOURS
Qty: 120 TABLET | Refills: 5 | Status: SHIPPED | OUTPATIENT
Start: 2022-01-20 | End: 2022-02-19

## 2022-01-28 ENCOUNTER — HOSPITAL ENCOUNTER (OUTPATIENT)
Dept: CT IMAGING | Facility: HOSPITAL | Age: 57
Discharge: HOME | End: 2022-01-28
Admitting: INTERNAL MEDICINE

## 2022-01-28 DIAGNOSIS — R10.10 PAIN OF UPPER ABDOMEN: ICD-10-CM

## 2022-01-28 PROCEDURE — 74177 CT ABD & PELVIS W/CONTRAST: CPT

## 2022-01-28 PROCEDURE — 25010000002 IOPAMIDOL 61 % SOLUTION: Performed by: INTERNAL MEDICINE

## 2022-01-28 RX ADMIN — IOPAMIDOL 90 ML: 612 INJECTION, SOLUTION INTRAVENOUS at 13:52

## 2022-02-01 ENCOUNTER — OFFICE VISIT (OUTPATIENT)
Dept: GASTROENTEROLOGY | Facility: CLINIC | Age: 57
End: 2022-02-01

## 2022-02-01 VITALS
SYSTOLIC BLOOD PRESSURE: 125 MMHG | WEIGHT: 158.8 LBS | HEART RATE: 66 BPM | HEIGHT: 67 IN | BODY MASS INDEX: 24.92 KG/M2 | DIASTOLIC BLOOD PRESSURE: 62 MMHG

## 2022-02-01 DIAGNOSIS — R10.10 PAIN OF UPPER ABDOMEN: Primary | ICD-10-CM

## 2022-02-01 PROCEDURE — 99214 OFFICE O/P EST MOD 30 MIN: CPT | Performed by: INTERNAL MEDICINE

## 2022-02-01 NOTE — PATIENT INSTRUCTIONS
Managing the Challenge of Quitting Smoking  Quitting smoking is a physical and mental challenge. You will face cravings, withdrawal symptoms, and temptation. Before quitting, work with your health care provider to make a plan that can help you manage quitting. Preparation can help you quit and keep you from giving in.  How to manage lifestyle changes  Managing stress  Stress can make you want to smoke, and wanting to smoke may cause stress. It is important to find ways to manage your stress. You might try some of the following:  · Practice relaxation techniques.  ? Breathe slowly and deeply, in through your nose and out through your mouth.  ? Listen to music.  ? Soak in a bath or take a shower.  ? Imagine a peaceful place or vacation.  · Get some support.  ? Talk with family or friends about your stress.  ? Join a support group.  ? Talk with a counselor or therapist.  · Get some physical activity.  ? Go for a walk, run, or bike ride.  ? Play a favorite sport.  ? Practice yoga.    Medicines  Talk with your health care provider about medicines that might help you deal with cravings and make quitting easier for you.  Relationships  Social situations can be difficult when you are quitting smoking. To manage this, you can:  · Avoid parties and other social situations where people might be smoking.  · Avoid alcohol.  · Leave right away if you have the urge to smoke.  · Explain to your family and friends that you are quitting smoking. Ask for support and let them know you might be a bit grumpy.  · Plan activities where smoking is not an option.  General instructions  Be aware that many people gain weight after they quit smoking. However, not everyone does. To keep from gaining weight, have a plan in place before you quit and stick to the plan after you quit. Your plan should include:  · Having healthy snacks. When you have a craving, it may help to:  ? Eat popcorn, carrots, celery, or other cut vegetables.  ? Chew  sugar-free gum.  · Changing how you eat.  ? Eat small portion sizes at meals.  ? Eat 4-6 small meals throughout the day instead of 1-2 large meals a day.  ? Be mindful when you eat. Do not watch television or do other things that might distract you as you eat.  · Exercising regularly.  ? Make time to exercise each day. If you do not have time for a long workout, do short bouts of exercise for 5-10 minutes several times a day.  ? Do some form of strengthening exercise, such as weight lifting.  ? Do some exercise that gets your heart beating and causes you to breathe deeply, such as walking fast, running, swimming, or biking. This is very important.  · Drinking plenty of water or other low-calorie or no-calorie drinks. Drink 6-8 glasses of water daily.    How to recognize withdrawal symptoms  Your body and mind may experience discomfort as you try to get used to not having nicotine in your system. These effects are called withdrawal symptoms. They may include:  · Feeling hungrier than normal.  · Having trouble concentrating.  · Feeling irritable or restless.  · Having trouble sleeping.  · Feeling depressed.  · Craving a cigarette.  To manage withdrawal symptoms:  · Avoid places, people, and activities that trigger your cravings.  · Remember why you want to quit.  · Get plenty of sleep.  · Avoid coffee and other caffeinated drinks. These may worsen some of your symptoms.  These symptoms may surprise you. But be assured that they are normal to have when quitting smoking.  How to manage cravings  Come up with a plan for how to deal with your cravings. The plan should include the following:  · A definition of the specific situation you want to deal with.  · An alternative action you will take.  · A clear idea for how this action will help.  · The name of someone who might help you with this.  Cravings usually last for 5-10 minutes. Consider taking the following actions to help you with your plan to deal with  cravings:  · Keep your mouth busy.  ? Chew sugar-free gum.  ? Suck on hard candies or a straw.  ? Brush your teeth.  · Keep your hands and body busy.  ? Change to a different activity right away.  ? Squeeze or play with a ball.  ? Do an activity or a hobby, such as making bead jewelry, practicing needlepoint, or working with wood.  ? Mix up your normal routine.  ? Take a short exercise break. Go for a quick walk or run up and down stairs.  · Focus on doing something kind or helpful for someone else.  · Call a friend or family member to talk during a craving.  · Join a support group.  · Contact a quitline.  Where to find support  To get help or find a support group:  · Call the National [employer] [place]'s Smoking Quitline: 2-179-QUIT NOW (772-1993)  · Visit the website of the Substance Abuse and Mental Health Services Administration: www.samhsa.gov  · Text QUIT to SmokefreeTXT: 036983  Where to find more information  Visit these websites to find more information on quitting smoking:  · [employer] [place]: www.smokefree.gov  · American Lung Association: www.lung.org  · American Cancer Society: www.cancer.org  · Centers for Disease Control and Prevention: www.cdc.gov  · American Heart Association: www.heart.org  Contact a health care provider if:  · You want to change your plan for quitting.  · The medicines you are taking are not helping.  · Your eating feels out of control or you cannot sleep.  Get help right away if:  · You feel depressed or become very anxious.  Summary  · Quitting smoking is a physical and mental challenge. You will face cravings, withdrawal symptoms, and temptation to smoke again. Preparation can help you as you go through these challenges.  · Try different techniques to manage stress, handle social situations, and prevent weight gain.  · You can deal with cravings by keeping your mouth busy (such as by chewing gum), keeping your hands and body busy, calling family or friends, or  contacting a quitline for people who want to quit smoking.  · You can deal with withdrawal symptoms by avoiding places where people smoke, getting plenty of rest, and avoiding drinks with caffeine.  This information is not intended to replace advice given to you by your health care provider. Make sure you discuss any questions you have with your health care provider.  Document Revised: 10/06/2020 Document Reviewed: 10/06/2020  Elsevier Patient Education © 2021 [employer]  Abdominal Pain, Adult  Many things can cause belly (abdominal) pain. Most times, belly pain is not dangerous. Many cases of belly pain can be watched and treated at home. Sometimes, though, belly pain is serious. Your doctor will try to find the cause of your belly pain.  Follow these instructions at home:    Medicines  · Take over-the-counter and prescription medicines only as told by your doctor.  · Do not take medicines that help you poop (laxatives) unless told by your doctor.  General instructions  · Watch your belly pain for any changes.  · Drink enough fluid to keep your pee (urine) pale yellow.  · Keep all follow-up visits as told by your doctor. This is important.  Contact a doctor if:  · Your belly pain changes or gets worse.  · You are not hungry, or you lose weight without trying.  · You are having trouble pooping (constipated) or have watery poop (diarrhea) for more than 2-3 days.  · You have pain when you pee or poop.  · Your belly pain wakes you up at night.  · Your pain gets worse with meals, after eating, or with certain foods.  · You are vomiting and cannot keep anything down.  · You have a fever.  · You have blood in your pee.  Get help right away if:  · Your pain does not go away as soon as your doctor says it should.  · You cannot stop vomiting.  · Your pain is only in areas of your belly, such as the right side or the left lower part of the belly.  · You have bloody or black poop, or poop that looks like tar.  · You have  very bad pain, cramping, or bloating in your belly.  · You have signs of not having enough fluid or water in your body (dehydration), such as:  ? Dark pee, very little pee, or no pee.  ? Cracked lips.  ? Dry mouth.  ? Sunken eyes.  ? Sleepiness.  ? Weakness.  · You have trouble breathing or chest pain.  Summary  · Many cases of belly pain can be watched and treated at home.  · Watch your belly pain for any changes.  · Take over-the-counter and prescription medicines only as told by your doctor.  · Contact a doctor if your belly pain changes or gets worse.  · Get help right away if you have very bad pain, cramping, or bloating in your belly.  This information is not intended to replace advice given to you by your health care provider. Make sure you discuss any questions you have with your health care provider.  Document Revised: 04/27/2020 Document Reviewed: 04/27/2020  Elsevier Patient Education © 2021 [employer] Inc[employer]

## 2022-02-01 NOTE — PROGRESS NOTES
Chief Complaint   Patient presents with   • CT Abdomen Pelvis With Contrast 01/28/2022     Pain Of Upper Abdomen       Subjective    Mario Crump is a 56 y.o. male.    History of Present Illness  Patient presented to GI clinic for follow-up visit today.  Has intermittent symptoms with epigastric abdominal pain and bloating.  Symptoms are worse with fatty food intake.  Denied nausea, vomiting, diarrhea, constipation, rectal bleeding or weight loss.  GERD is well controlled with PPI.  Taking Prilosec, Bentyl and Carafate daily.  EGD was consistent with esophagitis and gastritis.  Path was consistent with reactive gastropathy.  Colonoscopy was consistent with adenomatous colon polyps.  CT abdomen pelvis was consistent with avascular necrosis of the femoral heads.  Patient denied hip pain.  Aware of history of avascular necrosis in the past.       The following portions of the patient's history were reviewed and updated as appropriate:   Past Medical History:   Diagnosis Date   • Anxiety    • Callus    • COPD (chronic obstructive pulmonary disease) (HCC)    • GERD (gastroesophageal reflux disease)    • Ingrown toenail    • Verruca      Past Surgical History:   Procedure Laterality Date   • BACK SURGERY     • COLONOSCOPY N/A 1/13/2022    Procedure: COLONOSCOPY;  Surgeon: Maryanne Maxwell MD;  Location: [facility] ENDOSCOPY;  Service: Gastroenterology;  Laterality: N/A;   • ENDOSCOPY N/A 5/23/2019    Procedure: ESOPHAGOGASTRODUODENOSCOPY;  Surgeon: Chacho Kennedy DO;  Location: [facility] ENDOSCOPY;  Service: Gastroenterology   • ENDOSCOPY N/A 1/13/2022    Procedure: ESOPHAGOGASTRODUODENOSCOPY;  Surgeon: Maryanne Maxwell MD;  Location: [facility] ENDOSCOPY;  Service: Gastroenterology;  Laterality: N/A;   • NECK SURGERY      x 3   • SHOULDER SURGERY Bilateral    • UPPER GASTROINTESTINAL ENDOSCOPY  01/13/2022   • WRIST SURGERY Bilateral      Family History   Problem Relation Age of Onset   • Cancer Mother    •  Osteoporosis Father    • Heart disease Father    • Diabetes Father    • Heart disease Brother        Prior to Admission medications    Medication Sig Start Date End Date Taking? Authorizing Provider   albuterol sulfate  (90 Base) MCG/ACT inhaler Inhale 2 puffs Every 6 (Six) Hours As Needed. 11/23/21 2/22/22 Yes Cheyenne Andrade MD   dicyclomine (BENTYL) 20 MG tablet Take 1 tablet by mouth Every 6 (Six) Hours for 30 days. 1/20/22 2/19/22 Yes Maryanne Maxwell MD   fluticasone (FLONASE) 50 MCG/ACT nasal spray 2 sprays into the nostril(s) as directed by provider Daily.   Yes Cheyenne Andrade MD   omeprazole (priLOSEC) 40 MG capsule Take 40 mg by mouth Daily. 11/1/21 5/1/22 Yes Cheyenne Andrade MD   oxyCODONE-acetaminophen (PERCOCET)  MG per tablet Take 1 tablet by mouth Every 6 (Six) Hours.   Yes Cheyenne Andrade MD   sucralfate (Carafate) 1 g tablet Take 1 tablet by mouth 4 (Four) Times a Day for 30 days. 1/5/22 2/4/22 Yes Maryanne Maxwell MD   traZODone (DESYREL) 100 MG tablet Take 2 tablets by mouth every night at bedtime. 1/13/22 4/14/22 Yes Cheyenne Andrade MD     Allergies   Allergen Reactions   • Latex Rash     Social History     Socioeconomic History   • Marital status: Single   Tobacco Use   • Smoking status: Current Every Day Smoker     Packs/day: 1.00     Years: 30.00     Pack years: 30.00     Types: Cigarettes   • Smokeless tobacco: Never Used   Vaping Use   • Vaping Use: Never used   Substance and Sexual Activity   • Alcohol use: No   • Drug use: No   • Sexual activity: Defer       Review of Systems  Review of Systems   Constitutional: Negative for chills, fatigue, fever and unexpected weight change.   HENT: Negative for congestion, ear discharge, hearing loss, nosebleeds and sore throat.    Eyes: Negative for pain, discharge and redness.   Respiratory: Negative for cough, chest tightness, shortness of breath and wheezing.    Cardiovascular: Negative for chest pain  "and palpitations.   Gastrointestinal: Positive for abdominal pain. Negative for abdominal distention, blood in stool, constipation, diarrhea, nausea and vomiting.   Endocrine: Negative for cold intolerance, polydipsia, polyphagia and polyuria.   Genitourinary: Negative for dysuria, flank pain, frequency, hematuria and urgency.   Musculoskeletal: Negative for arthralgias, back pain, joint swelling and myalgias.   Skin: Negative for color change, pallor and rash.   Neurological: Negative for tremors, seizures, syncope, weakness and headaches.   Hematological: Negative for adenopathy. Does not bruise/bleed easily.   Psychiatric/Behavioral: Negative for behavioral problems, confusion, dysphoric mood, hallucinations and suicidal ideas. The patient is not nervous/anxious.         /62   Pulse 66   Ht 170.2 cm (67\")   Wt 72 kg (158 lb 12.8 oz)   BMI 24.87 kg/m²     Objective    Physical Exam  Constitutional:       Appearance: He is well-developed.   HENT:      Head: Normocephalic and atraumatic.   Eyes:      Conjunctiva/sclera: Conjunctivae normal.      Pupils: Pupils are equal, round, and reactive to light.   Neck:      Thyroid: No thyromegaly.   Cardiovascular:      Rate and Rhythm: Normal rate and regular rhythm.      Heart sounds: Normal heart sounds. No murmur heard.      Pulmonary:      Effort: Pulmonary effort is normal.      Breath sounds: Normal breath sounds. No wheezing.   Abdominal:      General: Bowel sounds are normal. There is no distension.      Palpations: Abdomen is soft. There is no mass.      Tenderness: There is no abdominal tenderness.      Hernia: No hernia is present.   Genitourinary:     Comments: No lesions noted  Musculoskeletal:         General: No tenderness. Normal range of motion.      Cervical back: Normal range of motion and neck supple.   Lymphadenopathy:      Cervical: No cervical adenopathy.   Skin:     General: Skin is warm and dry.      Findings: No rash.   Neurological:      " Mental Status: He is alert and oriented to person, place, and time.      Cranial Nerves: No cranial nerve deficit.   Psychiatric:         Thought Content: Thought content normal.       Admission on 01/13/2022, Discharged on 01/13/2022   Component Date Value Ref Range Status   • Case Report 01/13/2022    Final                    Value:Surgical Pathology Report                         Case: [id number]                                  Authorizing Provider:  Maryanne Maxwell MD      Collected:           01/13/2022 10:48 AM          Ordering Location:     [facility]   Received:            01/13/2022 01:18 PM                                 [place] [facility]                                                     Pathologist:           Evangelist Issa MD                                                           Specimens:   1) - Small Intestine, Duodenum, small bowel bx                                                      2) - Gastric, Antrum, antrum bx                                                                     3) - Esophagus, eg junction bx                                                                      4) - Large Intestine, Right / Ascending Colon, ascending colon polyp (cold snare)                   5) - Large Intestine, Transverse Colon, transverse colon polyp  (cold snare)              • Final Diagnosis 01/13/2022    Final                    Value:This result contains rich text formatting which cannot be displayed here.     Assessment/Plan      1. Pain of upper abdomen    1.  Epigastric pain with bloating, likely due to reactive gastropathy.  Continue PPI and Carafate 1 g p.o. 4 times daily.    Obtain right upper quadrant sonogram for biliary evaluation.  2.  Colon polyps, repeat colonoscopy in 3 years.  3.  GERD, well controlled with PPI.  Continue PPI and antireflux lifestyle.  4.  Tobacco usage, recommend cessation.  5.  Avascular necrosis of the femoral head, recommend PCP  evaluation.      Orders placed during this encounter include:  Orders Placed This Encounter   Procedures   • US Gallbladder     Standing Status:   Future     Standing Expiration Date:   2/1/2023     Order Specific Question:   Reason for Exam:     Answer:   epigastric pain       * Surgery not found *    Review and/or summary of lab tests, radiology, procedures, medications. Review and summary of old records and obtaining of history. The risks and benefits of my recommendations, as well as other treatment options were discussed with the patient and his family member today. Questions were answered.    No orders of the defined types were placed in this encounter.      Follow-up: Return in about 1 month (around 3/1/2022).               Results for orders placed or performed during the hospital encounter of 01/13/22   Tissue Pathology Exam    Specimen: A: Small Intestine, Duodenum; Tissue    B: Gastric, Antrum; Tissue    C: Esophagus; Tissue    D: Large Intestine, Right / Ascending Colon; Tissue    E: Large Intestine, Transverse Colon; Tissue   Result Value Ref Range    Case Report       Surgical Pathology Report                         Case: [id number]                                  Authorizing Provider:  Maryanne Maxwell MD      Collected:           01/13/2022 10:48 AM          Ordering Location:     [facility]   Received:            01/13/2022 01:18 PM                                 [place] [facility]                                                     Pathologist:           Evangelist Issa MD                                                           Specimens:   1) - Small Intestine, Duodenum, small bowel bx                                                      2) - Gastric, Antrum, antrum bx                                                                     3) - Esophagus, eg junction bx                                                                      4) - Large Intestine, Right /  "Ascending Colon, ascending colon polyp (cold snare)                   5) - Large Intestine, Transverse Colon, transverse colon polyp  (cold snare)               Final Diagnosis       SEE SCANNED REPORT       Results for orders placed or performed in visit on 01/11/22   COVID-19, [facility] IN-HOUSE, NP SWAB IN TRANSPORT MEDIA 8-10 HR TAT - Swab, Nasopharynx    Specimen: Nasopharynx; Swab   Result Value Ref Range    COVID19 Not Detected Not Detected - Ref. Range   Results for orders placed or performed during the hospital encounter of 05/23/19   Tissue Pathology Exam    Specimen: Gastric, Antrum; Tissue   Result Value Ref Range    Case Report       Surgical Pathology Report                         Case: [id number]                                  Authorizing Provider:  Chacho Kennedy DO      Collected:           05/23/2019 09:18 AM          [facility]             Received:            05/23/2019 10:37 AM                                 [place] [facility]                                                     Pathologist:           Kris Busch MD                                                         Specimen:    Gastric, Antrum                                                                            Final Diagnosis       MUCOSA, ANTRUM OF STOMACH:  REACTIVE GASTROPATHY.      Gross Description       The container is labeled \"antrum of stomach\" and has nodular bits of white soft tissue measuring 0.3 cc in aggregate.  The entire specimen is embedded as 1A.     Duodenal Culture, Quantitative - Body Fluid, Small Intestine, Duodenum    Specimen: Small Intestine, Duodenum; Body Fluid   Result Value Ref Range    Duodenal Culture 50,000 CFU/mL Streptococcus, Alpha Hemolytic (A)     Duodenal Culture <10,000 CFU/mL Staphylococcus, coagulase negative (A)    Results for orders placed or performed in visit on 04/21/10   Converted Surgical Pathology    Specimen: Tissue   Result Value Ref Range " "   Spec Descr 1 SPECIMEN(S): A EPIDIDYMIS, LEFT     Clinical Information   CLINICAL HISTORY:  None Given       Clinical Diagnosis   CLINICAL DIAGNOSIS:  Left epididymis       Gross Description         GROSS DESCRIPTION:  The specimen is labeled \"epididi. (left)\".  This measures 3.4 x 1.0 x  0.9 cm.  It is longitudinally sectioned and entirely submitted in 1  block.      Final Diagnosis         FINAL DIAGNOSIS:  LEFT EPIDIDYMIS, EXCISION:       EPIDIDYMAL CYST.       DILATED DUCTS, CONSISTENT WITH THE HISTORY OF VASECTOMY.    DIAGNOSIS CODE:  3      CONVERTED (HISTORICAL) FINAL PATHOLOGIST       Diagnostician:  MOHIT SAINZ M.D.  Pathologist  Electronically Signed 04/26/2010     Results for orders placed or performed in visit on 07/23/09   Converted Surgical Pathology    Specimen: Tissue   Result Value Ref Range    Spec Descr 1 SPECIMEN(S): A LESION, RIGHT EAR     Clinical Information   CLINICAL HISTORY:  None Given       Clinical Diagnosis         CLINICAL DIAGNOSIS:  Rule out basal cell carcinoma      Gross Description         GROSS DESCRIPTION:  The specimen is labeled \"right ear\" and consists of a fusiform skin  fragment measuring 0.7 x 0.5 x 0.2 cm.  Long and short sutures bay the  12 o'clock and 6 o'clock poles.  The margin from 12-3 is marked with  yellow ink, and the margin from 3-6 is marked with blue ink.  The margin  from 12-6 is marked with black ink, and the specimen is serially  sectioned and entirely submitted for frozen section in one block.      Frozen Diagnosis         FROZEN SECTION DIAGNOSIS:       Chondrodermatitis nodularis helicis.      Final Diagnosis         FINAL DIAGNOSIS:  SKIN AND SUBCUTIS, RIGHT EAR, HELIX:       CHONDRODERMATITIS NODULARIS HELICIS.    DIAGNOSIS CODE:  6      CONVERTED (HISTORICAL) FINAL PATHOLOGIST       Diagnostician:  MOHIT SAINZ M.D.  Pathologist  Electronically Signed 07/27/2009     Results for orders placed or performed in visit on 08/03/01   Converted " "Surgical Pathology    Specimen: Tissue   Result Value Ref Range    Spec Descr 1 SPECIMEN(S): A HEMORRHOIDS     Gross Description         GROSS DESCRIPTION:  The container is labeled \"hemorrhoids\" and has 4 segments of bluish-gray  mucosa measuring 2.0 x 1.6 x 1.2 cm, 2.7 x 2.2 x 1.7 cm, 1.5 x 1.0 x 0.7  cm and 0.8 x 0.6 x 0.5 cm.  The cut surfaces are heavily congested.  A  section from each specimen is embedded.      Final Diagnosis         FINAL DIAGNOSIS:  EXCISION, ANUS:             INTERNAL AND EXTERNAL HEMORRHOIDS WITH MILD CHRONIC                  INFLAMMATION.         trm    DIAGNOSIS CODE:  6      Comment   CLINICAL DIAGNOSIS:  Hemorrhoids       CONVERTED (HISTORICAL) FINAL PATHOLOGIST       Diagnostician:  ASHISH SPENCER M.D.  Pathologist  Electronically Signed 08/07/2001           This document has been electronically signed by Maryanne Maxwell MD on February 1, 2022 12:37 CST   "

## 2022-02-06 NOTE — PROGRESS NOTES
Chief Complaint   Patient presents with   • Abdominal Pain       Subjective    Mario Crump is a 56 y.o. male.    History of Present Illness  Patient presented to GI clinic for follow-up visit today.  Has intermittent bouts of epigastric pain and bloating which is worse with food intake.  Denies nausea, vomiting, diarrhea, constipation, rectal bleeding or weight loss.  EGD was consistent with esophagitis and gastritis.  Colonoscopy was consistent with adenomatous colon polyps and hemorrhoids.       The following portions of the patient's history were reviewed and updated as appropriate:   Past Medical History:   Diagnosis Date   • Anxiety    • Callus    • COPD (chronic obstructive pulmonary disease) (HCC)    • GERD (gastroesophageal reflux disease)    • Ingrown toenail    • Verruca      Past Surgical History:   Procedure Laterality Date   • BACK SURGERY     • COLONOSCOPY N/A 1/13/2022    Procedure: COLONOSCOPY;  Surgeon: Maryanne Maxwell MD;  Location: [facility] ENDOSCOPY;  Service: Gastroenterology;  Laterality: N/A;   • ENDOSCOPY N/A 5/23/2019    Procedure: ESOPHAGOGASTRODUODENOSCOPY;  Surgeon: Chacho Kennedy DO;  Location: [facility] ENDOSCOPY;  Service: Gastroenterology   • ENDOSCOPY N/A 1/13/2022    Procedure: ESOPHAGOGASTRODUODENOSCOPY;  Surgeon: Maryanne Maxwell MD;  Location: [facility] ENDOSCOPY;  Service: Gastroenterology;  Laterality: N/A;   • NECK SURGERY      x 3   • SHOULDER SURGERY Bilateral    • UPPER GASTROINTESTINAL ENDOSCOPY  01/13/2022   • WRIST SURGERY Bilateral      Family History   Problem Relation Age of Onset   • Cancer Mother    • Osteoporosis Father    • Heart disease Father    • Diabetes Father    • Heart disease Brother        Prior to Admission medications    Medication Sig Start Date End Date Taking? Authorizing Provider   albuterol sulfate  (90 Base) MCG/ACT inhaler Inhale 2 puffs Every 6 (Six) Hours As Needed. 11/23/21 2/22/22 Yes Provider, MD Cheyenne   fluticasone  (FLONASE) 50 MCG/ACT nasal spray 2 sprays into the nostril(s) as directed by provider Daily.   Yes ProviderCheyenne MD   omeprazole (priLOSEC) 40 MG capsule Take 40 mg by mouth Daily. 11/1/21 5/1/22 Yes ProviderCheyenne MD   oxyCODONE-acetaminophen (PERCOCET)  MG per tablet Take 1 tablet by mouth Every 6 (Six) Hours.   Yes ProviderCheyenne MD   traZODone (DESYREL) 100 MG tablet Take 2 tablets by mouth every night at bedtime. 1/13/22 4/14/22 Yes ProviderCheyenne MD   dicyclomine (BENTYL) 20 MG tablet Take 1 tablet by mouth Every 6 (Six) Hours for 30 days. 1/20/22 2/19/22  Maryanne Maxwell MD     Allergies   Allergen Reactions   • Latex Rash     Social History     Socioeconomic History   • Marital status: Single   Tobacco Use   • Smoking status: Current Every Day Smoker     Packs/day: 1.00     Years: 30.00     Pack years: 30.00     Types: Cigarettes   • Smokeless tobacco: Never Used   Vaping Use   • Vaping Use: Never used   Substance and Sexual Activity   • Alcohol use: No   • Drug use: No   • Sexual activity: Defer       Review of Systems  Review of Systems   Constitutional: Negative for chills, fatigue, fever and unexpected weight change.   HENT: Negative for congestion, ear discharge, hearing loss, nosebleeds and sore throat.    Eyes: Negative for pain, discharge and redness.   Respiratory: Negative for cough, chest tightness, shortness of breath and wheezing.    Cardiovascular: Negative for chest pain and palpitations.   Gastrointestinal: Positive for abdominal pain. Negative for abdominal distention, blood in stool, constipation, diarrhea, nausea and vomiting.   Endocrine: Negative for cold intolerance, polydipsia, polyphagia and polyuria.   Genitourinary: Negative for dysuria, flank pain, frequency, hematuria and urgency.   Musculoskeletal: Negative for arthralgias, back pain, joint swelling and myalgias.   Skin: Negative for color change, pallor and rash.   Neurological: Negative for  "tremors, seizures, syncope, weakness and headaches.   Hematological: Negative for adenopathy. Does not bruise/bleed easily.   Psychiatric/Behavioral: Negative for behavioral problems, confusion, dysphoric mood, hallucinations and suicidal ideas. The patient is not nervous/anxious.    Has bloating.     /75   Pulse 71   Ht 170.2 cm (67\")   Wt 72.2 kg (159 lb 3.2 oz)   BMI 24.93 kg/m²     Objective    Physical Exam  Constitutional:       Appearance: He is well-developed.   HENT:      Head: Normocephalic and atraumatic.   Eyes:      Conjunctiva/sclera: Conjunctivae normal.      Pupils: Pupils are equal, round, and reactive to light.   Neck:      Thyroid: No thyromegaly.   Cardiovascular:      Rate and Rhythm: Normal rate and regular rhythm.      Heart sounds: Normal heart sounds. No murmur heard.      Pulmonary:      Effort: Pulmonary effort is normal.      Breath sounds: Normal breath sounds. No wheezing.   Abdominal:      General: Bowel sounds are normal. There is no distension.      Palpations: Abdomen is soft. There is no mass.      Tenderness: There is no abdominal tenderness.      Hernia: No hernia is present.   Genitourinary:     Comments: No lesions noted  Musculoskeletal:         General: No tenderness. Normal range of motion.      Cervical back: Normal range of motion and neck supple.   Lymphadenopathy:      Cervical: No cervical adenopathy.   Skin:     General: Skin is warm and dry.      Findings: No rash.   Neurological:      Mental Status: He is alert and oriented to person, place, and time.      Cranial Nerves: No cranial nerve deficit.   Psychiatric:         Thought Content: Thought content normal.       Admission on 01/13/2022, Discharged on 01/13/2022   Component Date Value Ref Range Status   • Case Report 01/13/2022    Final                    Value:Surgical Pathology Report                         Case: [id number]                                  Authorizing Provider:  Maryanne Maxwell MD  "     Collected:           01/13/2022 10:48 AM          Ordering Location:     [facility]   Received:            01/13/2022 01:18 PM                                 [facility]                                                     Pathologist:           Evangelist Issa MD                                                           Specimens:   1) - Small Intestine, Duodenum, small bowel bx                                                      2) - Gastric, Antrum, antrum bx                                                                     3) - Esophagus, eg junction bx                                                                      4) - Large Intestine, Right / Ascending Colon, ascending colon polyp (cold snare)                   5) - Large Intestine, Transverse Colon, transverse colon polyp  (cold snare)              • Final Diagnosis 01/13/2022    Final                    Value:This result contains rich text formatting which cannot be displayed here.     Assessment/Plan      1. Pain of upper abdomen    1.  Epigastric pain with bloating, likely due to reactive gastropathy.  Continue PPI and Carafate 1 g p.o. 4 times daily.   Add Bentyl 20 mg p.o. 4 times daily.  Obtain CT abdomen pelvis for further evaluation.  2.  Colon polyps, repeat colonoscopy in 3 years.  3.  GERD, well controlled with PPI.  Continue PPI and antireflux lifestyle.  4.  Tobacco usage, recommend cessation.        Orders placed during this encounter include:  Orders Placed This Encounter   Procedures   • CT Abdomen Pelvis With Contrast     Standing Status:   Future     Number of Occurrences:   1     Standing Expiration Date:   1/20/2023     Order Specific Question:   Will Oral Contrast be needed for this procedure?     Answer:   No     Order Specific Question:   Release to patient     Answer:   Immediate       * Surgery not found *    Review and/or summary of lab tests, radiology, procedures, medications. Review and  summary of old records and obtaining of history. The risks and benefits of my recommendations, as well as other treatment options were discussed with the patient today. Questions were answered.    New Medications Ordered This Visit   Medications   • dicyclomine (BENTYL) 20 MG tablet     Sig: Take 1 tablet by mouth Every 6 (Six) Hours for 30 days.     Dispense:  120 tablet     Refill:  5       Follow-up: Return in about 1 month (around 2/20/2022).               Results for orders placed or performed during the hospital encounter of 01/13/22   Tissue Pathology Exam    Specimen: A: Small Intestine, Duodenum; Tissue    B: Gastric, Antrum; Tissue    C: Esophagus; Tissue    D: Large Intestine, Right / Ascending Colon; Tissue    E: Large Intestine, Transverse Colon; Tissue   Result Value Ref Range    Case Report       Surgical Pathology Report                         Case: [id number]                                  Authorizing Provider:  Maryanne Maxwell MD      Collected:           01/13/2022 10:48 AM          Ordering Location:     [facility]   Received:            01/13/2022 01:18 PM                                 [facility]                                                     Pathologist:           Evangelist Issa MD                                                           Specimens:   1) - Small Intestine, Duodenum, small bowel bx                                                      2) - Gastric, Antrum, antrum bx                                                                     3) - Esophagus, eg junction bx                                                                      4) - Large Intestine, Right / Ascending Colon, ascending colon polyp (cold snare)                   5) - Large Intestine, Transverse Colon, transverse colon polyp  (cold snare)               Final Diagnosis       SEE SCANNED REPORT       Results for orders placed or performed in visit on 01/11/22   COVID-Sola, JOSHUA  "[facility] IN-HOUSE, NP SWAB IN TRANSPORT MEDIA 8-10 HR TAT - Swab, Nasopharynx    Specimen: Nasopharynx; Swab   Result Value Ref Range    COVID19 Not Detected Not Detected - Ref. Range   Results for orders placed or performed during the hospital encounter of 05/23/19   Tissue Pathology Exam    Specimen: Gastric, Antrum; Tissue   Result Value Ref Range    Case Report       Surgical Pathology Report                         Case: [id number]                                  Authorizing Provider:  Chacho Kennedy DO      Collected:           05/23/2019 09:18 AM          Ordering Location:     [facility]             Received:            05/23/2019 10:37 AM                                 [facility]                                                     Pathologist:           Kris Busch MD                                                         Specimen:    Gastric, Antrum                                                                            Final Diagnosis       MUCOSA, ANTRUM OF STOMACH:  REACTIVE GASTROPATHY.      Gross Description       The container is labeled \"antrum of stomach\" and has nodular bits of white soft tissue measuring 0.3 cc in aggregate.  The entire specimen is embedded as 1A.     Duodenal Culture, Quantitative - Body Fluid, Small Intestine, Duodenum    Specimen: Small Intestine, Duodenum; Body Fluid   Result Value Ref Range    Duodenal Culture 50,000 CFU/mL Streptococcus, Alpha Hemolytic (A)     Duodenal Culture <10,000 CFU/mL Staphylococcus, coagulase negative (A)    Results for orders placed or performed in visit on 04/21/10   Converted Surgical Pathology    Specimen: Tissue   Result Value Ref Range    Spec Descr 1 SPECIMEN(S): A EPIDIDYMIS, LEFT     Clinical Information   CLINICAL HISTORY:  None Given       Clinical Diagnosis   CLINICAL DIAGNOSIS:  Left epididymis       Gross Description         GROSS DESCRIPTION:  The specimen is labeled \"epididi. (left)\".  This measures 3.4 " "x 1.0 x  0.9 cm.  It is longitudinally sectioned and entirely submitted in 1  block.      Final Diagnosis         FINAL DIAGNOSIS:  LEFT EPIDIDYMIS, EXCISION:       EPIDIDYMAL CYST.       DILATED DUCTS, CONSISTENT WITH THE HISTORY OF VASECTOMY.    DIAGNOSIS CODE:  3      CONVERTED (HISTORICAL) FINAL PATHOLOGIST       Diagnostician:  MOHIT SAINZ M.D.  Pathologist  Electronically Signed 04/26/2010     Results for orders placed or performed in visit on 07/23/09   Converted Surgical Pathology    Specimen: Tissue   Result Value Ref Range    Spec Descr 1 SPECIMEN(S): A LESION, RIGHT EAR     Clinical Information   CLINICAL HISTORY:  None Given       Clinical Diagnosis         CLINICAL DIAGNOSIS:  Rule out basal cell carcinoma      Gross Description         GROSS DESCRIPTION:  The specimen is labeled \"right ear\" and consists of a fusiform skin  fragment measuring 0.7 x 0.5 x 0.2 cm.  Long and short sutures bay the  12 o'clock and 6 o'clock poles.  The margin from 12-3 is marked with  yellow ink, and the margin from 3-6 is marked with blue ink.  The margin  from 12-6 is marked with black ink, and the specimen is serially  sectioned and entirely submitted for frozen section in one block.      Frozen Diagnosis         FROZEN SECTION DIAGNOSIS:       Chondrodermatitis nodularis helicis.      Final Diagnosis         FINAL DIAGNOSIS:  SKIN AND SUBCUTIS, RIGHT EAR, HELIX:       CHONDRODERMATITIS NODULARIS HELICIS.    DIAGNOSIS CODE:  6      CONVERTED (HISTORICAL) FINAL PATHOLOGIST       Diagnostician:  MOHIT SAINZ M.D.  Pathologist  Electronically Signed 07/27/2009     Results for orders placed or performed in visit on 08/03/01   Converted Surgical Pathology    Specimen: Tissue   Result Value Ref Range    Spec Descr 1 SPECIMEN(S): A HEMORRHOIDS     Gross Description         GROSS DESCRIPTION:  The container is labeled \"hemorrhoids\" and has 4 segments of bluish-gray  mucosa measuring 2.0 x 1.6 x 1.2 cm, 2.7 x 2.2 x 1.7 cm, " 1.5 x 1.0 x 0.7  cm and 0.8 x 0.6 x 0.5 cm.  The cut surfaces are heavily congested.  A  section from each specimen is embedded.      Final Diagnosis         FINAL DIAGNOSIS:  EXCISION, ANUS:             INTERNAL AND EXTERNAL HEMORRHOIDS WITH MILD CHRONIC                  INFLAMMATION.         trm    DIAGNOSIS CODE:  6      Comment   CLINICAL DIAGNOSIS:  Hemorrhoids       CONVERTED (HISTORICAL) FINAL PATHOLOGIST       Diagnostician:  ASHISH SPENCER M.D.  Pathologist  Electronically Signed 08/07/2001           This document has been electronically signed by Maryanne Maxwell MD on February 6, 2022 12:31 CST

## 2022-02-22 ENCOUNTER — HOSPITAL ENCOUNTER (OUTPATIENT)
Dept: ULTRASOUND IMAGING | Facility: HOSPITAL | Age: 57
Discharge: HOME | End: 2022-02-22
Admitting: INTERNAL MEDICINE

## 2022-02-22 DIAGNOSIS — R10.10 PAIN OF UPPER ABDOMEN: ICD-10-CM

## 2022-02-22 PROCEDURE — 76705 ECHO EXAM OF ABDOMEN: CPT

## 2022-03-01 ENCOUNTER — OFFICE VISIT (OUTPATIENT)
Dept: GASTROENTEROLOGY | Facility: CLINIC | Age: 57
End: 2022-03-01

## 2022-03-01 VITALS
DIASTOLIC BLOOD PRESSURE: 67 MMHG | HEIGHT: 67 IN | BODY MASS INDEX: 24.61 KG/M2 | SYSTOLIC BLOOD PRESSURE: 117 MMHG | WEIGHT: 156.8 LBS | HEART RATE: 72 BPM

## 2022-03-01 DIAGNOSIS — R11.0 NAUSEA: ICD-10-CM

## 2022-03-01 DIAGNOSIS — R10.10 PAIN OF UPPER ABDOMEN: Primary | ICD-10-CM

## 2022-03-01 DIAGNOSIS — R14.0 BLOATING: ICD-10-CM

## 2022-03-01 PROCEDURE — 99214 OFFICE O/P EST MOD 30 MIN: CPT | Performed by: INTERNAL MEDICINE

## 2022-03-01 RX ORDER — SUCRALFATE 1 G/1
1 TABLET ORAL 4 TIMES DAILY
Qty: 120 TABLET | Refills: 4 | Status: SHIPPED | OUTPATIENT
Start: 2022-03-01 | End: 2022-03-31

## 2022-03-07 ENCOUNTER — HOSPITAL ENCOUNTER (OUTPATIENT)
Dept: NUCLEAR MEDICINE | Facility: HOSPITAL | Age: 57
Discharge: HOME | End: 2022-03-07

## 2022-03-07 DIAGNOSIS — R14.0 BLOATING: ICD-10-CM

## 2022-03-07 DIAGNOSIS — R11.0 NAUSEA: ICD-10-CM

## 2022-03-07 DIAGNOSIS — R10.10 PAIN OF UPPER ABDOMEN: ICD-10-CM

## 2022-03-07 PROCEDURE — A9537 TC99M MEBROFENIN: HCPCS | Performed by: INTERNAL MEDICINE

## 2022-03-07 PROCEDURE — 78226 HEPATOBILIARY SYSTEM IMAGING: CPT

## 2022-03-07 PROCEDURE — 0 TECHNETIUM TC 99M MEBROFENIN KIT: Performed by: INTERNAL MEDICINE

## 2022-03-07 RX ORDER — KIT FOR THE PREPARATION OF TECHNETIUM TC 99M MEBROFENIN 45 MG/10ML
1 INJECTION, POWDER, LYOPHILIZED, FOR SOLUTION INTRAVENOUS
Status: COMPLETED | OUTPATIENT
Start: 2022-03-07 | End: 2022-03-07

## 2022-03-07 RX ADMIN — MEBROFENIN 1 DOSE: 45 INJECTION, POWDER, LYOPHILIZED, FOR SOLUTION INTRAVENOUS at 09:07

## 2022-03-22 ENCOUNTER — HOSPITAL ENCOUNTER (OUTPATIENT)
Facility: HOSPITAL | Age: 57
Setting detail: HOSPITAL OUTPATIENT SURGERY
End: 2022-03-22
Attending: INTERNAL MEDICINE | Admitting: INTERNAL MEDICINE

## 2022-03-22 ENCOUNTER — OFFICE VISIT (OUTPATIENT)
Dept: GASTROENTEROLOGY | Facility: CLINIC | Age: 57
End: 2022-03-22

## 2022-03-22 VITALS
WEIGHT: 158.8 LBS | HEIGHT: 67 IN | SYSTOLIC BLOOD PRESSURE: 113 MMHG | DIASTOLIC BLOOD PRESSURE: 69 MMHG | HEART RATE: 65 BPM | BODY MASS INDEX: 24.92 KG/M2

## 2022-03-22 DIAGNOSIS — R10.10 PAIN OF UPPER ABDOMEN: Primary | ICD-10-CM

## 2022-03-22 PROCEDURE — 99214 OFFICE O/P EST MOD 30 MIN: CPT | Performed by: INTERNAL MEDICINE

## 2022-03-22 RX ORDER — FAMOTIDINE 40 MG/1
40 TABLET, FILM COATED ORAL NIGHTLY
Qty: 30 TABLET | Refills: 6 | Status: SHIPPED | OUTPATIENT
Start: 2022-03-22 | End: 2022-04-21

## 2022-03-22 RX ORDER — DEXTROSE AND SODIUM CHLORIDE 5; .45 G/100ML; G/100ML
30 INJECTION, SOLUTION INTRAVENOUS CONTINUOUS PRN
Status: CANCELLED | OUTPATIENT
Start: 2022-03-22

## 2022-03-23 NOTE — PROGRESS NOTES
Chief Complaint   Patient presents with   • Ultrasound Of Gallblader Performed 02/22/2022       Subjective    Mario Crump is a 56 y.o. male.    History of Present Illness  Patient presented to GI clinic for follow-up visit today.  Has continued symptoms with epigastric pain with food intake associated with bloating.  Denies nausea or vomiting.  Bowel movements are regular.  Weight is stable.  Taking Prilosec daily.  EGD was consistent with esophagitis and gastritis.  Path was consistent with reactive gastropathy.  Colonoscopy was consistent with adenomatous colon polyps and hemorrhoids.  CT abdomen pelvis was unremarkable.  Right upper quadrant sonogram was unremarkable as well.       The following portions of the patient's history were reviewed and updated as appropriate:   Past Medical History:   Diagnosis Date   • Anxiety    • Callus    • COPD (chronic obstructive pulmonary disease) (HCC)    • GERD (gastroesophageal reflux disease)    • Ingrown toenail    • Verruca      Past Surgical History:   Procedure Laterality Date   • BACK SURGERY     • COLONOSCOPY N/A 1/13/2022    Procedure: COLONOSCOPY;  Surgeon: Maryanne Maxwell MD;  Location: [facility] ENDOSCOPY;  Service: Gastroenterology;  Laterality: N/A;   • ENDOSCOPY N/A 5/23/2019    Procedure: ESOPHAGOGASTRODUODENOSCOPY;  Surgeon: Chacho Kennedy DO;  Location: [facility] ENDOSCOPY;  Service: Gastroenterology   • ENDOSCOPY N/A 1/13/2022    Procedure: ESOPHAGOGASTRODUODENOSCOPY;  Surgeon: Maryanne Maxwell MD;  Location: [facility] ENDOSCOPY;  Service: Gastroenterology;  Laterality: N/A;   • NECK SURGERY      x 3   • SHOULDER SURGERY Bilateral    • UPPER GASTROINTESTINAL ENDOSCOPY  01/13/2022   • WRIST SURGERY Bilateral      Family History   Problem Relation Age of Onset   • Cancer Mother    • Osteoporosis Father    • Heart disease Father    • Diabetes Father    • Heart disease Brother        Prior to Admission medications    Medication Sig Start Date End Date  Taking? Authorizing Provider   fluticasone (FLONASE) 50 MCG/ACT nasal spray 2 sprays into the nostril(s) as directed by provider Daily.   Yes Cheyenne Andrade MD   omeprazole (priLOSEC) 40 MG capsule Take 40 mg by mouth Daily. 11/1/21 5/1/22 Yes Cheyenne Andrade MD   oxyCODONE-acetaminophen (PERCOCET)  MG per tablet Take 1 tablet by mouth Every 6 (Six) Hours.   Yes Cheyenne Andrade MD   traZODone (DESYREL) 100 MG tablet Take 2 tablets by mouth every night at bedtime. 1/13/22 4/14/22 Yes Cheyenne Andrade MD   famotidine (Pepcid) 40 MG tablet Take 1 tablet by mouth Every Night for 30 days. 3/22/22 4/21/22  Maryanne Maxwell MD   sucralfate (Carafate) 1 g tablet Take 1 tablet by mouth 4 (Four) Times a Day for 30 days. 3/1/22 3/31/22  Maryanne Maxwell MD     Allergies   Allergen Reactions   • Latex Rash     Social History     Socioeconomic History   • Marital status: Single   Tobacco Use   • Smoking status: Current Every Day Smoker     Packs/day: 1.00     Years: 30.00     Pack years: 30.00     Types: Cigarettes   • Smokeless tobacco: Never Used   Vaping Use   • Vaping Use: Never used   Substance and Sexual Activity   • Alcohol use: No   • Drug use: No   • Sexual activity: Defer       Review of Systems  Review of Systems   Constitutional: Negative for chills, fatigue, fever and unexpected weight change.   HENT: Negative for congestion, ear discharge, hearing loss, nosebleeds and sore throat.    Eyes: Negative for pain, discharge and redness.   Respiratory: Negative for cough, chest tightness, shortness of breath and wheezing.    Cardiovascular: Negative for chest pain and palpitations.   Gastrointestinal: Positive for abdominal pain. Negative for abdominal distention, blood in stool, constipation, diarrhea, nausea and vomiting.   Endocrine: Negative for cold intolerance, polydipsia, polyphagia and polyuria.   Genitourinary: Negative for dysuria, flank pain, frequency, hematuria and urgency.  "  Musculoskeletal: Negative for arthralgias, back pain, joint swelling and myalgias.   Skin: Negative for color change, pallor and rash.   Neurological: Negative for tremors, seizures, syncope, weakness and headaches.   Hematological: Negative for adenopathy. Does not bruise/bleed easily.   Psychiatric/Behavioral: Negative for behavioral problems, confusion, dysphoric mood, hallucinations and suicidal ideas. The patient is not nervous/anxious.    Has bloating.     /67   Pulse 72   Ht 170.2 cm (67\")   Wt 71.1 kg (156 lb 12.8 oz)   BMI 24.56 kg/m²     Objective    Physical Exam  Constitutional:       Appearance: He is well-developed.   HENT:      Head: Normocephalic and atraumatic.   Eyes:      Conjunctiva/sclera: Conjunctivae normal.      Pupils: Pupils are equal, round, and reactive to light.   Neck:      Thyroid: No thyromegaly.   Cardiovascular:      Rate and Rhythm: Normal rate and regular rhythm.      Heart sounds: Normal heart sounds. No murmur heard.  Pulmonary:      Effort: Pulmonary effort is normal.      Breath sounds: Normal breath sounds. No wheezing.   Abdominal:      General: Bowel sounds are normal. There is no distension.      Palpations: Abdomen is soft. There is no mass.      Tenderness: There is no abdominal tenderness.      Hernia: No hernia is present.   Genitourinary:     Comments: No lesions noted  Musculoskeletal:         General: No tenderness. Normal range of motion.      Cervical back: Normal range of motion and neck supple.   Lymphadenopathy:      Cervical: No cervical adenopathy.   Skin:     General: Skin is warm and dry.      Findings: No rash.   Neurological:      Mental Status: He is alert and oriented to person, place, and time.      Cranial Nerves: No cranial nerve deficit.   Psychiatric:         Thought Content: Thought content normal.       Admission on 01/13/2022, Discharged on 01/13/2022   Component Date Value Ref Range Status   • Case Report 01/13/2022    Final         "            Value:Surgical Pathology Report                         Case: [id number]                                  Authorizing Provider:  Maryanne Maxwell MD      Collected:           01/13/2022 10:48 AM          Ordering Location:     [facility]   Received:            01/13/2022 01:18 PM                                 [place] [facility]                                                     Pathologist:           Evangelist Issa MD                                                           Specimens:   1) - Small Intestine, Duodenum, small bowel bx                                                      2) - Gastric, Antrum, antrum bx                                                                     3) - Esophagus, eg junction bx                                                                      4) - Large Intestine, Right / Ascending Colon, ascending colon polyp (cold snare)                   5) - Large Intestine, Transverse Colon, transverse colon polyp  (cold snare)              • Final Diagnosis 01/13/2022    Final                    Value:This result contains rich text formatting which cannot be displayed here.     Assessment/Plan      1. Pain of upper abdomen    2. Bloating    3. Nausea    1.  Epigastric pain with bloating, likely due to reactive gastropathy and need to rule out biliary dyskinesia.  Continue PPI and Carafate 1 g p.o. 4 times daily.   Add Bentyl 20 mg p.o. 4 times daily.  Obtain  HIDA scan..  2.  Colon polyps, repeat colonoscopy in 3 years.  3.  GERD, well controlled with PPI.  Continue PPI and antireflux lifestyle.  4.  Tobacco usage, recommend cessation.      Orders placed during this encounter include:  Orders Placed This Encounter   Procedures   • NM Hepatobiliary Without CCK     Standing Status:   Future     Number of Occurrences:   1     Standing Expiration Date:   3/1/2023     Order Specific Question:   Do you want ejection fraction for this procedure?     Answer:    Yes     Order Specific Question:   Release to patient     Answer:   Immediate       * Surgery not found *    Review and/or summary of lab tests, radiology, procedures, medications. Review and summary of old records and obtaining of history. The risks and benefits of my recommendations, as well as other treatment options were discussed with the patient today. Questions were answered.    New Medications Ordered This Visit   Medications   • sucralfate (Carafate) 1 g tablet     Sig: Take 1 tablet by mouth 4 (Four) Times a Day for 30 days.     Dispense:  120 tablet     Refill:  4       Follow-up: Return in about 1 month (around 4/1/2022).               Results for orders placed or performed during the hospital encounter of 01/13/22   Tissue Pathology Exam    Specimen: A: Small Intestine, Duodenum; Tissue    B: Gastric, Antrum; Tissue    C: Esophagus; Tissue    D: Large Intestine, Right / Ascending Colon; Tissue    E: Large Intestine, Transverse Colon; Tissue   Result Value Ref Range    Case Report       Surgical Pathology Report                         Case: [id number]                                  Authorizing Provider:  Maryanne Maxwell MD      Collected:           01/13/2022 10:48 AM          Ordering Location:     [facility]   Received:            01/13/2022 01:18 PM                                 [place] [facility]                                                     Pathologist:           Evangelist Issa MD                                                           Specimens:   1) - Small Intestine, Duodenum, small bowel bx                                                      2) - Gastric, Antrum, antrum bx                                                                     3) - Esophagus, eg junction bx                                                                      4) - Large Intestine, Right / Ascending Colon, ascending colon polyp (cold snare)                   5) - Large Intestine,  "Transverse Colon, transverse colon polyp  (cold snare)               Final Diagnosis       SEE SCANNED REPORT       Results for orders placed or performed in visit on 01/11/22   COVID-19, [facility] IN-HOUSE, NP SWAB IN TRANSPORT MEDIA 8-10 HR TAT - Swab, Nasopharynx    Specimen: Nasopharynx; Swab   Result Value Ref Range    COVID19 Not Detected Not Detected - Ref. Range   Results for orders placed or performed during the hospital encounter of 05/23/19   Tissue Pathology Exam    Specimen: Gastric, Antrum; Tissue   Result Value Ref Range    Case Report       Surgical Pathology Report                         Case: [id number]                                  Authorizing Provider:  Chacho Kennedy DO      Collected:           05/23/2019 09:18 AM          Ordering Location:     [facility]             Received:            05/23/2019 10:37 AM                                 [place] [facility]                                                     Pathologist:           Kris Busch MD                                                         Specimen:    Gastric, Antrum                                                                            Final Diagnosis       MUCOSA, ANTRUM OF STOMACH:  REACTIVE GASTROPATHY.      Gross Description       The container is labeled \"antrum of stomach\" and has nodular bits of white soft tissue measuring 0.3 cc in aggregate.  The entire specimen is embedded as 1A.     Duodenal Culture, Quantitative - Body Fluid, Small Intestine, Duodenum    Specimen: Small Intestine, Duodenum; Body Fluid   Result Value Ref Range    Duodenal Culture 50,000 CFU/mL Streptococcus, Alpha Hemolytic (A)     Duodenal Culture <10,000 CFU/mL Staphylococcus, coagulase negative (A)    Results for orders placed or performed in visit on 04/21/10   Converted Surgical Pathology    Specimen: Tissue   Result Value Ref Range    Spec Descr 1 SPECIMEN(S): A EPIDIDYMIS, LEFT     Clinical Information   CLINICAL " "HISTORY:  None Given       Clinical Diagnosis   CLINICAL DIAGNOSIS:  Left epididymis       Gross Description         GROSS DESCRIPTION:  The specimen is labeled \"epididi. (left)\".  This measures 3.4 x 1.0 x  0.9 cm.  It is longitudinally sectioned and entirely submitted in 1  block.      Final Diagnosis         FINAL DIAGNOSIS:  LEFT EPIDIDYMIS, EXCISION:       EPIDIDYMAL CYST.       DILATED DUCTS, CONSISTENT WITH THE HISTORY OF VASECTOMY.    DIAGNOSIS CODE:  3      CONVERTED (HISTORICAL) FINAL PATHOLOGIST       Diagnostician:  MOHIT SAINZ M.D.  Pathologist  Electronically Signed 04/26/2010     Results for orders placed or performed in visit on 07/23/09   Converted Surgical Pathology    Specimen: Tissue   Result Value Ref Range    Spec Descr 1 SPECIMEN(S): A LESION, RIGHT EAR     Clinical Information   CLINICAL HISTORY:  None Given       Clinical Diagnosis         CLINICAL DIAGNOSIS:  Rule out basal cell carcinoma      Gross Description         GROSS DESCRIPTION:  The specimen is labeled \"right ear\" and consists of a fusiform skin  fragment measuring 0.7 x 0.5 x 0.2 cm.  Long and short sutures bay the  12 o'clock and 6 o'clock poles.  The margin from 12-3 is marked with  yellow ink, and the margin from 3-6 is marked with blue ink.  The margin  from 12-6 is marked with black ink, and the specimen is serially  sectioned and entirely submitted for frozen section in one block.      Frozen Diagnosis         FROZEN SECTION DIAGNOSIS:       Chondrodermatitis nodularis helicis.      Final Diagnosis         FINAL DIAGNOSIS:  SKIN AND SUBCUTIS, RIGHT EAR, HELIX:       CHONDRODERMATITIS NODULARIS HELICIS.    DIAGNOSIS CODE:  6      CONVERTED (HISTORICAL) FINAL PATHOLOGIST       Diagnostician:  MOHIT SAINZ M.D.  Pathologist  Electronically Signed 07/27/2009     Results for orders placed or performed in visit on 08/03/01   Converted Surgical Pathology    Specimen: Tissue   Result Value Ref Range    Spec Descr 1 " "SPECIMEN(S): A HEMORRHOIDS     Gross Description         GROSS DESCRIPTION:  The container is labeled \"hemorrhoids\" and has 4 segments of bluish-gray  mucosa measuring 2.0 x 1.6 x 1.2 cm, 2.7 x 2.2 x 1.7 cm, 1.5 x 1.0 x 0.7  cm and 0.8 x 0.6 x 0.5 cm.  The cut surfaces are heavily congested.  A  section from each specimen is embedded.      Final Diagnosis         FINAL DIAGNOSIS:  EXCISION, ANUS:             INTERNAL AND EXTERNAL HEMORRHOIDS WITH MILD CHRONIC                  INFLAMMATION.         trm    DIAGNOSIS CODE:  6      Comment   CLINICAL DIAGNOSIS:  Hemorrhoids       CONVERTED (HISTORICAL) FINAL PATHOLOGIST       Diagnostician:  ASHISH SPENCER M.D.  Pathologist  Electronically Signed 08/07/2001           This document has been electronically signed by Maryanne Maxwell MD on March 22, 2022 23:14 CDT   "

## 2022-03-23 NOTE — PROGRESS NOTES
Chief Complaint   Patient presents with   • f/u after hida scan    • Abdominal Pain       Subjective    Mario Crump is a 56 y.o. male.    History of Present Illness  Patient presented to GI clinic for follow-up visit today.  Has continued symptoms with epigastric pain which is worse with food intake.  Denied nausea or vomiting.  Bowel movements are regular.  Weight is stable.  GERD symptoms are well controlled with PPI.  CT abdomen and pelvis was unremarkable.  Abdominal ultrasound was unremarkable.  HIDA scan was consistent with normal gallbladder ejection fraction.  He is taking PPI daily along with Carafate.       The following portions of the patient's history were reviewed and updated as appropriate:   Past Medical History:   Diagnosis Date   • Anxiety    • Callus    • COPD (chronic obstructive pulmonary disease) (HCC)    • GERD (gastroesophageal reflux disease)    • Ingrown toenail    • Verruca      Past Surgical History:   Procedure Laterality Date   • BACK SURGERY     • COLONOSCOPY N/A 1/13/2022    Procedure: COLONOSCOPY;  Surgeon: Maryanne Maxwell MD;  Location: [facility] ENDOSCOPY;  Service: Gastroenterology;  Laterality: N/A;   • ENDOSCOPY N/A 5/23/2019    Procedure: ESOPHAGOGASTRODUODENOSCOPY;  Surgeon: Chacho Kennedy DO;  Location: [facility] ENDOSCOPY;  Service: Gastroenterology   • ENDOSCOPY N/A 1/13/2022    Procedure: ESOPHAGOGASTRODUODENOSCOPY;  Surgeon: Maryanne Maxwell MD;  Location: [facility] ENDOSCOPY;  Service: Gastroenterology;  Laterality: N/A;   • NECK SURGERY      x 3   • SHOULDER SURGERY Bilateral    • UPPER GASTROINTESTINAL ENDOSCOPY  01/13/2022   • WRIST SURGERY Bilateral      Family History   Problem Relation Age of Onset   • Cancer Mother    • Osteoporosis Father    • Heart disease Father    • Diabetes Father    • Heart disease Brother        Prior to Admission medications    Medication Sig Start Date End Date Taking? Authorizing Provider   fluticasone (FLONASE) 50 MCG/ACT nasal  spray 2 sprays into the nostril(s) as directed by provider Daily.   Yes Cheyenne Andrade MD   omeprazole (priLOSEC) 40 MG capsule Take 40 mg by mouth Daily. 11/1/21 5/1/22 Yes Cheyenne Andrade MD   oxyCODONE-acetaminophen (PERCOCET)  MG per tablet Take 1 tablet by mouth Every 6 (Six) Hours.   Yes Cheyenne Andrade MD   sucralfate (Carafate) 1 g tablet Take 1 tablet by mouth 4 (Four) Times a Day for 30 days. 3/1/22 3/31/22 Yes Maryanne Maxwell MD   traZODone (DESYREL) 100 MG tablet Take 2 tablets by mouth every night at bedtime. 1/13/22 4/14/22 Yes Cheyenne Andrade MD   famotidine (Pepcid) 40 MG tablet Take 1 tablet by mouth Every Night for 30 days. 3/22/22 4/21/22  Maryanne Maxwell MD     Allergies   Allergen Reactions   • Latex Rash     Social History     Socioeconomic History   • Marital status: Single   Tobacco Use   • Smoking status: Current Every Day Smoker     Packs/day: 1.00     Years: 30.00     Pack years: 30.00     Types: Cigarettes   • Smokeless tobacco: Never Used   Vaping Use   • Vaping Use: Never used   Substance and Sexual Activity   • Alcohol use: No   • Drug use: No   • Sexual activity: Defer       Review of Systems  Review of Systems   Constitutional: Negative for chills, fatigue, fever and unexpected weight change.   HENT: Negative for congestion, ear discharge, hearing loss, nosebleeds and sore throat.    Eyes: Negative for pain, discharge and redness.   Respiratory: Negative for cough, chest tightness, shortness of breath and wheezing.    Cardiovascular: Negative for chest pain and palpitations.   Gastrointestinal: Positive for abdominal pain. Negative for abdominal distention, blood in stool, constipation, diarrhea, nausea and vomiting.   Endocrine: Negative for cold intolerance, polydipsia, polyphagia and polyuria.   Genitourinary: Negative for dysuria, flank pain, frequency, hematuria and urgency.   Musculoskeletal: Negative for arthralgias, back pain, joint  "swelling and myalgias.   Skin: Negative for color change, pallor and rash.   Neurological: Negative for tremors, seizures, syncope, weakness and headaches.   Hematological: Negative for adenopathy. Does not bruise/bleed easily.   Psychiatric/Behavioral: Negative for behavioral problems, confusion, dysphoric mood, hallucinations and suicidal ideas. The patient is not nervous/anxious.         /69 (BP Location: Right arm)   Pulse 65   Ht 170.2 cm (67\")   Wt 72 kg (158 lb 12.8 oz)   BMI 24.87 kg/m²     Objective    Physical Exam  Constitutional:       Appearance: He is well-developed.   HENT:      Head: Normocephalic and atraumatic.   Eyes:      Conjunctiva/sclera: Conjunctivae normal.      Pupils: Pupils are equal, round, and reactive to light.   Neck:      Thyroid: No thyromegaly.   Cardiovascular:      Rate and Rhythm: Normal rate and regular rhythm.      Heart sounds: Normal heart sounds. No murmur heard.  Pulmonary:      Effort: Pulmonary effort is normal.      Breath sounds: Normal breath sounds. No wheezing.   Abdominal:      General: Bowel sounds are normal. There is no distension.      Palpations: Abdomen is soft. There is no mass.      Tenderness: There is no abdominal tenderness.      Hernia: No hernia is present.   Genitourinary:     Comments: No lesions noted  Musculoskeletal:         General: No tenderness. Normal range of motion.      Cervical back: Normal range of motion and neck supple.   Lymphadenopathy:      Cervical: No cervical adenopathy.   Skin:     General: Skin is warm and dry.      Findings: No rash.   Neurological:      Mental Status: He is alert and oriented to person, place, and time.      Cranial Nerves: No cranial nerve deficit.   Psychiatric:         Thought Content: Thought content normal.       Admission on 01/13/2022, Discharged on 01/13/2022   Component Date Value Ref Range Status   • Case Report 01/13/2022    Final                    Value:Surgical Pathology Report          "                Case: [id number]                                  Authorizing Provider:  Maryanne Maxwell MD      Collected:           01/13/2022 10:48 AM          Ordering Location:     [facility]   Received:            01/13/2022 01:18 PM                                 [place] [facility]                                                     Pathologist:           Evangelist Issa MD                                                           Specimens:   1) - Small Intestine, Duodenum, small bowel bx                                                      2) - Gastric, Antrum, antrum bx                                                                     3) - Esophagus, eg junction bx                                                                      4) - Large Intestine, Right / Ascending Colon, ascending colon polyp (cold snare)                   5) - Large Intestine, Transverse Colon, transverse colon polyp  (cold snare)              • Final Diagnosis 01/13/2022    Final                    Value:This result contains rich text formatting which cannot be displayed here.     Assessment/Plan      1. Pain of upper abdomen    1.  Epigastric pain, likely due to reactive gastropathy and could also be due to atypical acid reflux.  Continue PPI, Carafate 1 g p.o. 4 times daily and add Bentyl 20 mg p.o. 4 times daily.  Schedule EGD with Bravo pH probe study for further evaluation.  May need endoscopic ultrasound if symptoms continue.  2.  Colon polyps, repeat colonoscopy in 3 years.  3.  GERD, well controlled with PPI.  Continue PPI and antireflux lifestyle.  4.  Tobacco usage, recommend cessation.      Orders placed during this encounter include:  Orders Placed This Encounter   Procedures   • COVID PRE-OP / PRE-PROCEDURE SCREENING ORDER (NO ISOLATION) - Swab, Nasopharynx     Standing Status:   Future     Standing Expiration Date:   3/22/2023     Order Specific Question:   Release to patient     Answer:    Immediate     Order Specific Question:   Please select your location:     Answer:   [facility]     Order Specific Question:   COVID Screening Order:     Answer:   In-House: PRE-OP: BD MAX, 8-10 HR TAT [[id number]]     Order Specific Question:   Previously tested for COVID-19?     Answer:   Yes     Order Specific Question:   Employed in healthcare setting?     Answer:   No     Order Specific Question:   Symptomatic for COVID-19 as defined by CDC?     Answer:   No     Order Specific Question:   Hospitalized for COVID-19?     Answer:   No     Order Specific Question:   Admitted to ICU for COVID-19?     Answer:   No     Order Specific Question:   Resident in a congregate (group) care setting?     Answer:   No   • Follow Anesthesia Guidelines / Standing Orders     Standing Status:   Future   • Obtain Informed Consent     Standing Status:   Future     Order Specific Question:   Informed Consent Given For     Answer:   ESOPHAGOGASTRODUODENOSCOPY AND BRAVO       ESOPHAGOGASTRODUODENOSCOPY AND BRAVO (N/A)    Review and/or summary of lab tests, radiology, procedures, medications. Review and summary of old records and obtaining of history. The risks and benefits of my recommendations, as well as other treatment options were discussed with the patient today. Questions were answered.    New Medications Ordered This Visit   Medications   • famotidine (Pepcid) 40 MG tablet     Sig: Take 1 tablet by mouth Every Night for 30 days.     Dispense:  30 tablet     Refill:  6       Follow-up: Return in about 1 month (around 4/22/2022).               Results for orders placed or performed during the hospital encounter of 01/13/22   Tissue Pathology Exam    Specimen: A: Small Intestine, Duodenum; Tissue    B: Gastric, Antrum; Tissue    C: Esophagus; Tissue    D: Large Intestine, Right / Ascending Colon; Tissue    E: Large Intestine, Transverse Colon; Tissue   Result Value Ref Range    Case Report       Surgical Pathology Report                          Case: [id number]                                  Authorizing Provider:  Maryanne Maxwell MD      Collected:           01/13/2022 10:48 AM          Ordering Location:     [facility]   Received:            01/13/2022 01:18 PM                                 [place] [facility]                                                     Pathologist:           Evangelist Issa MD                                                           Specimens:   1) - Small Intestine, Duodenum, small bowel bx                                                      2) - Gastric, Antrum, antrum bx                                                                     3) - Esophagus, eg junction bx                                                                      4) - Large Intestine, Right / Ascending Colon, ascending colon polyp (cold snare)                   5) - Large Intestine, Transverse Colon, transverse colon polyp  (cold snare)               Final Diagnosis       SEE SCANNED REPORT       Results for orders placed or performed in visit on 01/11/22   COVID-19, [facility] IN-HOUSE, NP SWAB IN TRANSPORT MEDIA 8-10 HR TAT - Swab, Nasopharynx    Specimen: Nasopharynx; Swab   Result Value Ref Range    COVID19 Not Detected Not Detected - Ref. Range   Results for orders placed or performed during the hospital encounter of 05/23/19   Tissue Pathology Exam    Specimen: Gastric, Antrum; Tissue   Result Value Ref Range    Case Report       Surgical Pathology Report                         Case: [id number]                                  Authorizing Provider:  Chacho Kennedy DO      Collected:           05/23/2019 09:18 AM          Ordering Location:     [facility]             Received:            05/23/2019 10:37 AM                                 [place] [facility]                                                     Pathologist:           Kris Busch MD                                                  "        Specimen:    Gastric, Antrum                                                                            Final Diagnosis       MUCOSA, ANTRUM OF STOMACH:  REACTIVE GASTROPATHY.      Gross Description       The container is labeled \"antrum of stomach\" and has nodular bits of white soft tissue measuring 0.3 cc in aggregate.  The entire specimen is embedded as 1A.     Duodenal Culture, Quantitative - Body Fluid, Small Intestine, Duodenum    Specimen: Small Intestine, Duodenum; Body Fluid   Result Value Ref Range    Duodenal Culture 50,000 CFU/mL Streptococcus, Alpha Hemolytic (A)     Duodenal Culture <10,000 CFU/mL Staphylococcus, coagulase negative (A)    Results for orders placed or performed in visit on 04/21/10   Converted Surgical Pathology    Specimen: Tissue   Result Value Ref Range    Spec Descr 1 SPECIMEN(S): A EPIDIDYMIS, LEFT     Clinical Information   CLINICAL HISTORY:  None Given       Clinical Diagnosis   CLINICAL DIAGNOSIS:  Left epididymis       Gross Description         GROSS DESCRIPTION:  The specimen is labeled \"epididi. (left)\".  This measures 3.4 x 1.0 x  0.9 cm.  It is longitudinally sectioned and entirely submitted in 1  block.      Final Diagnosis         FINAL DIAGNOSIS:  LEFT EPIDIDYMIS, EXCISION:       EPIDIDYMAL CYST.       DILATED DUCTS, CONSISTENT WITH THE HISTORY OF VASECTOMY.    DIAGNOSIS CODE:  3      CONVERTED (HISTORICAL) FINAL PATHOLOGIST       Diagnostician:  MOHIT SAINZ M.D.  Pathologist  Electronically Signed 04/26/2010     Results for orders placed or performed in visit on 07/23/09   Converted Surgical Pathology    Specimen: Tissue   Result Value Ref Range    Spec Descr 1 SPECIMEN(S): A LESION, RIGHT EAR     Clinical Information   CLINICAL HISTORY:  None Given       Clinical Diagnosis         CLINICAL DIAGNOSIS:  Rule out basal cell carcinoma      Gross Description         GROSS DESCRIPTION:  The specimen is labeled \"right ear\" and consists of a fusiform skin  fragment " "measuring 0.7 x 0.5 x 0.2 cm.  Long and short sutures bay the  12 o'clock and 6 o'clock poles.  The margin from 12-3 is marked with  yellow ink, and the margin from 3-6 is marked with blue ink.  The margin  from 12-6 is marked with black ink, and the specimen is serially  sectioned and entirely submitted for frozen section in one block.      Frozen Diagnosis         FROZEN SECTION DIAGNOSIS:       Chondrodermatitis nodularis helicis.      Final Diagnosis         FINAL DIAGNOSIS:  SKIN AND SUBCUTIS, RIGHT EAR, HELIX:       CHONDRODERMATITIS NODULARIS HELICIS.    DIAGNOSIS CODE:  6      CONVERTED (HISTORICAL) FINAL PATHOLOGIST       Diagnostician:  MOHIT SAINZ M.D.  Pathologist  Electronically Signed 07/27/2009     Results for orders placed or performed in visit on 08/03/01   Converted Surgical Pathology    Specimen: Tissue   Result Value Ref Range    Spec Descr 1 SPECIMEN(S): A HEMORRHOIDS     Gross Description         GROSS DESCRIPTION:  The container is labeled \"hemorrhoids\" and has 4 segments of bluish-gray  mucosa measuring 2.0 x 1.6 x 1.2 cm, 2.7 x 2.2 x 1.7 cm, 1.5 x 1.0 x 0.7  cm and 0.8 x 0.6 x 0.5 cm.  The cut surfaces are heavily congested.  A  section from each specimen is embedded.      Final Diagnosis         FINAL DIAGNOSIS:  EXCISION, ANUS:             INTERNAL AND EXTERNAL HEMORRHOIDS WITH MILD CHRONIC                  INFLAMMATION.         trm    DIAGNOSIS CODE:  6      Comment   CLINICAL DIAGNOSIS:  Hemorrhoids       CONVERTED (HISTORICAL) FINAL PATHOLOGIST       Diagnostician:  ASHISH SPENCER M.D.  Pathologist  Electronically Signed 08/07/2001           This document has been electronically signed by Maryanne Maxwell MD on March 22, 2022 22:19 CDT   "

## (undated) DEVICE — CANN SMPL SOFTECH BIFLO ETCO2 A/M 7FT

## (undated) DEVICE — SINGLE-USE BIOPSY FORCEPS: Brand: RADIAL JAW 4

## (undated) DEVICE — BITEBLOCK ENDO W/STRAP 60F A/ LF DISP

## (undated) DEVICE — TRAP,MUCUS SPECIMEN,40CC: Brand: MEDLINE

## (undated) DEVICE — Device: Brand: DISPOSABLE ELECTROSURGICAL SNARE

## (undated) DEVICE — TRAP SXN POLYP QUICKCATCH LF